# Patient Record
Sex: FEMALE | Race: OTHER | NOT HISPANIC OR LATINO | URBAN - METROPOLITAN AREA
[De-identification: names, ages, dates, MRNs, and addresses within clinical notes are randomized per-mention and may not be internally consistent; named-entity substitution may affect disease eponyms.]

---

## 2020-01-04 ENCOUNTER — INPATIENT (INPATIENT)
Facility: HOSPITAL | Age: 20
LOS: 6 days | Discharge: HOME | DRG: 326 | End: 2020-01-11
Attending: GENERAL ACUTE CARE HOSPITAL | Admitting: GENERAL ACUTE CARE HOSPITAL
Payer: COMMERCIAL

## 2020-01-04 VITALS
DIASTOLIC BLOOD PRESSURE: 62 MMHG | HEART RATE: 107 BPM | SYSTOLIC BLOOD PRESSURE: 107 MMHG | RESPIRATION RATE: 16 BRPM | OXYGEN SATURATION: 100 % | TEMPERATURE: 99 F

## 2020-01-04 DIAGNOSIS — Z98.890 OTHER SPECIFIED POSTPROCEDURAL STATES: Chronic | ICD-10-CM

## 2020-01-04 LAB
ALBUMIN SERPL ELPH-MCNC: 3.1 G/DL — LOW (ref 3.3–5)
ALP SERPL-CCNC: 50 U/L — SIGNIFICANT CHANGE UP (ref 40–120)
ALT FLD-CCNC: 8 U/L — LOW (ref 10–45)
AMYLASE P1 CFR SERPL: 29 U/L — SIGNIFICANT CHANGE UP (ref 25–125)
ANION GAP SERPL CALC-SCNC: 11 MMOL/L — SIGNIFICANT CHANGE UP (ref 5–17)
APTT BLD: 33.5 SEC — SIGNIFICANT CHANGE UP (ref 27.5–36.3)
AST SERPL-CCNC: 9 U/L — LOW (ref 10–40)
BILIRUB SERPL-MCNC: 0.9 MG/DL — SIGNIFICANT CHANGE UP (ref 0.2–1.2)
BUN SERPL-MCNC: 3 MG/DL — LOW (ref 7–23)
CALCIUM SERPL-MCNC: 8.3 MG/DL — LOW (ref 8.4–10.5)
CHLORIDE SERPL-SCNC: 106 MMOL/L — SIGNIFICANT CHANGE UP (ref 96–108)
CO2 SERPL-SCNC: 20 MMOL/L — LOW (ref 22–31)
CREAT SERPL-MCNC: 0.7 MG/DL — SIGNIFICANT CHANGE UP (ref 0.5–1.3)
GLUCOSE BLDC GLUCOMTR-MCNC: 90 MG/DL — SIGNIFICANT CHANGE UP (ref 70–99)
GLUCOSE SERPL-MCNC: 97 MG/DL — SIGNIFICANT CHANGE UP (ref 70–99)
HCG UR QL: NEGATIVE — SIGNIFICANT CHANGE UP
HCT VFR BLD CALC: 30.5 % — LOW (ref 34.5–45)
HGB BLD-MCNC: 9.9 G/DL — LOW (ref 11.5–15.5)
INR BLD: 1.42 — HIGH (ref 0.88–1.16)
LACTATE SERPL-SCNC: 0.7 MMOL/L — SIGNIFICANT CHANGE UP (ref 0.5–2)
LACTATE SERPL-SCNC: 1.3 MMOL/L — SIGNIFICANT CHANGE UP (ref 0.5–2)
LIDOCAIN IGE QN: 15 U/L — SIGNIFICANT CHANGE UP (ref 7–60)
MAGNESIUM SERPL-MCNC: 1.7 MG/DL — SIGNIFICANT CHANGE UP (ref 1.6–2.6)
MCHC RBC-ENTMCNC: 30.7 PG — SIGNIFICANT CHANGE UP (ref 27–34)
MCHC RBC-ENTMCNC: 32.5 GM/DL — SIGNIFICANT CHANGE UP (ref 32–36)
MCV RBC AUTO: 94.4 FL — SIGNIFICANT CHANGE UP (ref 80–100)
NRBC # BLD: 0 /100 WBCS — SIGNIFICANT CHANGE UP (ref 0–0)
PHOSPHATE SERPL-MCNC: 2.4 MG/DL — LOW (ref 2.5–4.5)
PLATELET # BLD AUTO: 180 K/UL — SIGNIFICANT CHANGE UP (ref 150–400)
POTASSIUM SERPL-MCNC: 4 MMOL/L — SIGNIFICANT CHANGE UP (ref 3.5–5.3)
POTASSIUM SERPL-SCNC: 4 MMOL/L — SIGNIFICANT CHANGE UP (ref 3.5–5.3)
PROT SERPL-MCNC: 6 G/DL — SIGNIFICANT CHANGE UP (ref 6–8.3)
PROTHROM AB SERPL-ACNC: 16.4 SEC — HIGH (ref 10–12.9)
RBC # BLD: 3.23 M/UL — LOW (ref 3.8–5.2)
RBC # FLD: 11.8 % — SIGNIFICANT CHANGE UP (ref 10.3–14.5)
SODIUM SERPL-SCNC: 137 MMOL/L — SIGNIFICANT CHANGE UP (ref 135–145)
WBC # BLD: 10.81 K/UL — HIGH (ref 3.8–10.5)
WBC # FLD AUTO: 10.81 K/UL — HIGH (ref 3.8–10.5)

## 2020-01-04 PROCEDURE — 44187 LAP ILEO/JEJUNO-STOMY: CPT

## 2020-01-04 PROCEDURE — 43840 GSTRRPHY SUTR DUOL/GSTR ULCR: CPT

## 2020-01-04 PROCEDURE — 71045 X-RAY EXAM CHEST 1 VIEW: CPT | Mod: 26

## 2020-01-04 PROCEDURE — 43239 EGD BIOPSY SINGLE/MULTIPLE: CPT

## 2020-01-04 PROCEDURE — 99222 1ST HOSP IP/OBS MODERATE 55: CPT | Mod: GC

## 2020-01-04 PROCEDURE — 48547 DUODENAL EXCLUSION: CPT

## 2020-01-04 PROCEDURE — 49905 OMENTAL FLAP INTRA-ABDOM: CPT

## 2020-01-04 RX ORDER — SODIUM CHLORIDE 9 MG/ML
1000 INJECTION, SOLUTION INTRAVENOUS
Refills: 0 | Status: DISCONTINUED | OUTPATIENT
Start: 2020-01-04 | End: 2020-01-08

## 2020-01-04 RX ORDER — DEXTROSE 50 % IN WATER 50 %
25 SYRINGE (ML) INTRAVENOUS ONCE
Refills: 0 | Status: DISCONTINUED | OUTPATIENT
Start: 2020-01-04 | End: 2020-01-06

## 2020-01-04 RX ORDER — FLUCONAZOLE 150 MG/1
400 TABLET ORAL EVERY 24 HOURS
Refills: 0 | Status: DISCONTINUED | OUTPATIENT
Start: 2020-01-04 | End: 2020-01-11

## 2020-01-04 RX ORDER — PANTOPRAZOLE SODIUM 20 MG/1
40 TABLET, DELAYED RELEASE ORAL
Refills: 0 | Status: DISCONTINUED | OUTPATIENT
Start: 2020-01-04 | End: 2020-01-11

## 2020-01-04 RX ORDER — DEXTROSE 50 % IN WATER 50 %
15 SYRINGE (ML) INTRAVENOUS ONCE
Refills: 0 | Status: DISCONTINUED | OUTPATIENT
Start: 2020-01-04 | End: 2020-01-06

## 2020-01-04 RX ORDER — HYDROMORPHONE HYDROCHLORIDE 2 MG/ML
1 INJECTION INTRAMUSCULAR; INTRAVENOUS; SUBCUTANEOUS EVERY 4 HOURS
Refills: 0 | Status: DISCONTINUED | OUTPATIENT
Start: 2020-01-04 | End: 2020-01-08

## 2020-01-04 RX ORDER — PIPERACILLIN AND TAZOBACTAM 4; .5 G/20ML; G/20ML
3.38 INJECTION, POWDER, LYOPHILIZED, FOR SOLUTION INTRAVENOUS ONCE
Refills: 0 | Status: COMPLETED | OUTPATIENT
Start: 2020-01-04 | End: 2020-01-04

## 2020-01-04 RX ORDER — HYDROMORPHONE HYDROCHLORIDE 2 MG/ML
0.5 INJECTION INTRAMUSCULAR; INTRAVENOUS; SUBCUTANEOUS EVERY 4 HOURS
Refills: 0 | Status: DISCONTINUED | OUTPATIENT
Start: 2020-01-04 | End: 2020-01-04

## 2020-01-04 RX ORDER — SODIUM CHLORIDE 9 MG/ML
1000 INJECTION, SOLUTION INTRAVENOUS
Refills: 0 | Status: DISCONTINUED | OUTPATIENT
Start: 2020-01-04 | End: 2020-01-06

## 2020-01-04 RX ORDER — CHLORHEXIDINE GLUCONATE 213 G/1000ML
1 SOLUTION TOPICAL
Refills: 0 | Status: DISCONTINUED | OUTPATIENT
Start: 2020-01-04 | End: 2020-01-07

## 2020-01-04 RX ORDER — GLUCAGON INJECTION, SOLUTION 0.5 MG/.1ML
1 INJECTION, SOLUTION SUBCUTANEOUS ONCE
Refills: 0 | Status: DISCONTINUED | OUTPATIENT
Start: 2020-01-04 | End: 2020-01-08

## 2020-01-04 RX ORDER — DEXTROSE 50 % IN WATER 50 %
12.5 SYRINGE (ML) INTRAVENOUS ONCE
Refills: 0 | Status: DISCONTINUED | OUTPATIENT
Start: 2020-01-04 | End: 2020-01-06

## 2020-01-04 RX ORDER — HYDROMORPHONE HYDROCHLORIDE 2 MG/ML
0.5 INJECTION INTRAMUSCULAR; INTRAVENOUS; SUBCUTANEOUS EVERY 4 HOURS
Refills: 0 | Status: DISCONTINUED | OUTPATIENT
Start: 2020-01-04 | End: 2020-01-08

## 2020-01-04 RX ORDER — PIPERACILLIN AND TAZOBACTAM 4; .5 G/20ML; G/20ML
3.38 INJECTION, POWDER, LYOPHILIZED, FOR SOLUTION INTRAVENOUS EVERY 6 HOURS
Refills: 0 | Status: DISCONTINUED | OUTPATIENT
Start: 2020-01-04 | End: 2020-01-11

## 2020-01-04 RX ORDER — INSULIN LISPRO 100/ML
VIAL (ML) SUBCUTANEOUS
Refills: 0 | Status: DISCONTINUED | OUTPATIENT
Start: 2020-01-04 | End: 2020-01-06

## 2020-01-04 RX ADMIN — SODIUM CHLORIDE 100 MILLILITER(S): 9 INJECTION, SOLUTION INTRAVENOUS at 15:15

## 2020-01-04 RX ADMIN — CHLORHEXIDINE GLUCONATE 1 APPLICATION(S): 213 SOLUTION TOPICAL at 17:00

## 2020-01-04 RX ADMIN — FLUCONAZOLE 100 MILLIGRAM(S): 150 TABLET ORAL at 16:11

## 2020-01-04 RX ADMIN — HYDROMORPHONE HYDROCHLORIDE 0.5 MILLIGRAM(S): 2 INJECTION INTRAMUSCULAR; INTRAVENOUS; SUBCUTANEOUS at 23:38

## 2020-01-04 RX ADMIN — HYDROMORPHONE HYDROCHLORIDE 0.5 MILLIGRAM(S): 2 INJECTION INTRAMUSCULAR; INTRAVENOUS; SUBCUTANEOUS at 23:50

## 2020-01-04 RX ADMIN — HYDROMORPHONE HYDROCHLORIDE 0.5 MILLIGRAM(S): 2 INJECTION INTRAMUSCULAR; INTRAVENOUS; SUBCUTANEOUS at 15:52

## 2020-01-04 RX ADMIN — PIPERACILLIN AND TAZOBACTAM 200 GRAM(S): 4; .5 INJECTION, POWDER, LYOPHILIZED, FOR SOLUTION INTRAVENOUS at 15:51

## 2020-01-04 RX ADMIN — HYDROMORPHONE HYDROCHLORIDE 0.5 MILLIGRAM(S): 2 INJECTION INTRAMUSCULAR; INTRAVENOUS; SUBCUTANEOUS at 16:20

## 2020-01-04 NOTE — CONSULT NOTE ADULT - SUBJECTIVE AND OBJECTIVE BOX
SICU CONSULT NOTE    HPI:  20 yo F hx reduction mammaplasty, previously healthy until mid-November when diagnosed w/ H. pylori (positive urea breath test, s/p amoxicillin, erythromycin, prilosec) and pyloric stricture, s/p gastroscopy w/ pyloric dilatation and finding of pyloric channel ulcer (1/2/20), now transferred from [facility] w/ gastric perforation. Patient currently endorses abdominal pain and has NGT w/ no output. Has not had a BM since Monday, however was non-bloody and still passing gas. Was febrile at OSH but currently afebrile.    ROS: pertinent positives and negatives above  Social hx: non-smoker  Family hx: gastric cancer (paternal grandmother), hepatic cancer (maternal grandfather) (04 Jan 2020 15:06)    PAST MEDICAL & SURGICAL HISTORY:  Gastritis, Helicobacter pylori  H/O reduction mammoplasty      PAST SURGICAL HISTORY:     REVIEW OF SYSTEMS:   Pertinent positives/negatives noted in HPI.     HOME MEDICATIONS:    ALLERGIES:  Allergies    pseudoephedrine (Other)    Intolerances        SOCIAL HISTORY:    FAMILY HISTORY:      Vital Signs Last 24 Hrs  T(C): 37.2 (04 Jan 2020 14:30), Max: 37.2 (04 Jan 2020 14:30)  T(F): 99 (04 Jan 2020 14:30), Max: 99 (04 Jan 2020 14:30)  HR: 121 (04 Jan 2020 15:45) (107 - 121)  BP: 115/88 (04 Jan 2020 15:45) (107/62 - 115/88)  BP(mean): 107 (04 Jan 2020 15:45) (80 - 107)  RR: 16 (04 Jan 2020 15:45) (16 - 16)  SpO2: 99% (04 Jan 2020 15:45) (99% - 100%)    PHYSICAL EXAM:  General: NAD  Pulm: normal resp effort and excursion  Abd: soft, non-distended, mild to moderate diffuse TTP greatest at L mid-abdomen, no guarding or rebound appreciated, NGT no output currently  Ext: WWP  Neuro: AAO x3, grossly normal cranial nerves  Psych: appropriate affect    LABS:                        9.9    10.81 )-----------( 180      ( 04 Jan 2020 15:02 )             30.5     01-04    137  |  106  |  3<L>  ----------------------------<  97  4.0   |  20<L>  |  0.70    Ca    8.3<L>      04 Jan 2020 15:02  Phos  2.4     01-04  Mg     1.7     01-04    TPro  6.0  /  Alb  3.1<L>  /  TBili  0.9  /  DBili  x   /  AST  9<L>  /  ALT  8<L>  /  AlkPhos  50  01-04    PT/INR - ( 04 Jan 2020 15:02 )   PT: 16.4 sec;   INR: 1.42          PTT - ( 04 Jan 2020 15:02 )  PTT:33.5 sec SICU CONSULT NOTE    HPI:  20 yo F hx reduction mammaplasty, previously healthy until mid-November when diagnosed w/ H. pylori (positive urea breath test, s/p amoxicillin, erythromycin, prilosec) and pyloric stricture, s/p gastroscopy w/ pyloric dilatation and finding of pyloric channel ulcer (1/2/20), now transferred from [facility] w/ gastric perforation. Patient currently endorses abdominal pain and has NGT w/ no output. Has not had a BM since Monday, however was non-bloody and still passing gas. Was febrile at OSH but currently afebrile.    ROS: pertinent positives and negatives above  Social hx: non-smoker  Family hx: gastric cancer (paternal grandmother), hepatic cancer (maternal grandfather) (04 Jan 2020 15:06)    PAST MEDICAL & SURGICAL HISTORY:  Gastritis, Helicobacter pylori  H/O reduction mammoplasty      PAST SURGICAL HISTORY:     REVIEW OF SYSTEMS:   Pertinent positives/negatives noted in HPI.     HOME MEDICATIONS:    ALLERGIES:  Allergies    pseudoephedrine (Other)    Intolerances        SOCIAL HISTORY:    FAMILY HISTORY:      Vital Signs Last 24 Hrs  T(C): 37.2 (04 Jan 2020 14:30), Max: 37.2 (04 Jan 2020 14:30)  T(F): 99 (04 Jan 2020 14:30), Max: 99 (04 Jan 2020 14:30)  HR: 121 (04 Jan 2020 15:45) (107 - 121)  BP: 115/88 (04 Jan 2020 15:45) (107/62 - 115/88)  BP(mean): 107 (04 Jan 2020 15:45) (80 - 107)  RR: 16 (04 Jan 2020 15:45) (16 - 16)  SpO2: 99% (04 Jan 2020 15:45) (99% - 100%)    PHYSICAL EXAM:  General: NAD  HEENT: NGT in place, PERRL, EOMI, MMM  Pulm: normal resp effort and excursion  Abd: soft, non-distended, mild to moderate diffuse TTP greatest at L mid-abdomen, no guarding or rebound appreciated, NGT no output currently  Ext: WWP  Vasc: 2+ peripheral pulses  Neuro: AAO x3, grossly normal cranial nerves  Skin: no rash  Psych: appropriate affect    LABS:                        9.9    10.81 )-----------( 180      ( 04 Jan 2020 15:02 )             30.5     01-04    137  |  106  |  3<L>  ----------------------------<  97  4.0   |  20<L>  |  0.70    Ca    8.3<L>      04 Jan 2020 15:02  Phos  2.4     01-04  Mg     1.7     01-04    TPro  6.0  /  Alb  3.1<L>  /  TBili  0.9  /  DBili  x   /  AST  9<L>  /  ALT  8<L>  /  AlkPhos  50  01-04    PT/INR - ( 04 Jan 2020 15:02 )   PT: 16.4 sec;   INR: 1.42          PTT - ( 04 Jan 2020 15:02 )  PTT:33.5 sec    CXR clear with NGT in place and free air under right hemidiaphragm

## 2020-01-04 NOTE — H&P ADULT - NSHPPHYSICALEXAM_GEN_ALL_CORE
Vital Signs Last 24 Hrs  T(C): 37.2 (04 Jan 2020 14:30), Max: 37.2 (04 Jan 2020 14:30)  T(F): 99 (04 Jan 2020 14:30), Max: 99 (04 Jan 2020 14:30)  HR: 107 (04 Jan 2020 14:30) (107 - 107)  BP: 107/62 (04 Jan 2020 14:30) (107/62 - 107/62)  BP(mean): 80 (04 Jan 2020 14:30) (80 - 80)  RR: 16 (04 Jan 2020 14:30) (16 - 16)  SpO2: --    General: NAD  Pulm: normal resp effort and excursion  Abd: soft, non-distended, mild to moderate diffuse TTP greatest at L mid-abdomen, no guarding or rebound appreciated Vital Signs Last 24 Hrs  T(C): 37.2 (04 Jan 2020 14:30), Max: 37.2 (04 Jan 2020 14:30)  T(F): 99 (04 Jan 2020 14:30), Max: 99 (04 Jan 2020 14:30)  HR: 107 (04 Jan 2020 14:30) (107 - 107)  BP: 107/62 (04 Jan 2020 14:30) (107/62 - 107/62)  BP(mean): 80 (04 Jan 2020 14:30) (80 - 80)  RR: 16 (04 Jan 2020 14:30) (16 - 16)  SpO2: --    General: NAD  Pulm: normal resp effort and excursion  Abd: soft, non-distended, mild to moderate diffuse TTP greatest at L mid-abdomen, no guarding or rebound appreciated, NGT no output currently

## 2020-01-04 NOTE — H&P ADULT - HISTORY OF PRESENT ILLNESS
20 yo F hx reduction mammaplasty, previously healthy until mid-November when diagnosed w/ H. pylori (positive urea breath test, s/p amoxicillin, erythromycin, prilosec) and pyloric stricture, s/p gastroscopy w/ pyloric dilatation and finding of pyloric channel ulcer (1/2/19), now transferred from [facility] w/ gastric perforation. Patient currently endorses abdominal pain and has NGT w/ no output. Has not had a BM since Monday, however was non-bloody and still passing gas. Was febrile at OSH but currently afebrile.    ROS: pertinent positives and negatives above  Social hx: non-smoker  Family hx: gastric cancer (paternal grandmother), hepatic cancer (maternal grandfather) 18 yo F hx reduction mammaplasty, previously healthy until mid-November when diagnosed w/ H. pylori (positive urea breath test, s/p amoxicillin, erythromycin, prilosec) and pyloric stricture, s/p gastroscopy w/ pyloric dilatation and finding of pyloric channel ulcer (1/2/20), now transferred from [facility] w/ gastric perforation. Patient currently endorses abdominal pain and has NGT w/ no output. Has not had a BM since Monday, however was non-bloody and still passing gas. Was febrile at OSH but currently afebrile.    ROS: pertinent positives and negatives above  Social hx: non-smoker  Family hx: gastric cancer (paternal grandmother), hepatic cancer (maternal grandfather)

## 2020-01-04 NOTE — BRIEF OPERATIVE NOTE - OPERATION/FINDINGS
Patient transferred 48hrs after EGD complicated by ulcer perforation, likely at pylorus. Diagnostic laparoscopy performed, though dissection limited by significant adhesions and Patient transferred 48hrs after EGD complicated by ulcer perforation, likely at pylorus. EGD performed, largely unremarkable except area of inflammation, erythema, and obstruction at pylorus, through which scope was unable to be passed. Diagnostic laparoscopy performed, though dissection limited by significant adhesions, purulence, fibrinous exudate throughout RUQ. Area of most concern and presumed perforated area intimately involved with mercedes hepatis. Decision made to convert to laparotomy. Midline incision made to allow exposure to upper abdomen. Duodenum fully kocherized--no injury to posterior aspect noted. Unable to delineate distal aspect of friable tissue, so pyloric exclusion performed with TA stapler. Gastrojejunostomy performed with linear stapler, common enterotomy hand sewn, antecolic, retrogastric. Feeding jejunostomy placed distally and secured to anterior abdominal wall. Omental josette patch performed over pyloric area of perforation. Abdomen irrigated thoroughly. JPs placed over area of perforation and behind gastrojejunostomy. NGT placed and positioning confirmed. Abdomen then closed, skin loosely approximated.

## 2020-01-04 NOTE — BRIEF OPERATIVE NOTE - NSICDXBRIEFPROCEDURE_GEN_ALL_CORE_FT
PROCEDURES:  Omentopexy 04-Jan-2020 23:00:02  Lenin, Crystal  Open jejunostomy 04-Jan-2020 22:59:35  Lenin, Crystal  Gastrojejunostomy with duodenal exclusion 04-Jan-2020 22:59:20  Lenin, Crystal  EGD 04-Jan-2020 22:59:03  Lenin, Isa  Diagnostic laparoscopy with exploratory laparotomy 04-Jan-2020 22:58:28  Lenin, Isa  Diagnostic laparoscopy 04-Jan-2020 22:58:20  Lenin, Isa

## 2020-01-04 NOTE — CONSULT NOTE ADULT - ASSESSMENT
20 yo F hx H. pylori s/p triple therapy and pyloric stenosis w/ pyloric channel ulcer s/p EGD w/ pyloric dilatation on 1/2/20 presents as transfer from OSH w/ gastric perforation.    Neuro: pain/nausea control  CV: normotensive goals  Pulm: hygiene  FENGI: LR @100, NPO, protonix BID, NGT  : voids  ID: zosyn (1/2 - ), fluconazole (1/4 - ) ///  Endo: ISS  PPx: SCDs, holding SQH as ambulatory 20 yo F hx H. pylori s/p triple therapy and pyloric stenosis w/ pyloric channel ulcer s/p EGD w/ pyloric dilatation on 1/2/20 presents as transfer from OSH w/ gastric perforation.    Neuro: pain/nausea control  CV: normotensive goals; perfusion currently adequate on 100 ml/hour  Pulm: hygiene  FENGI: LR @100, NPO, protonix BID, NGT  : voids  ID: zosyn (1/2 - ), added fluconazole (1/4 - ) empirically ///  Endo: ISS  PPx: SCDs, holding SQH as ambulatory    Plans for OR in progress.

## 2020-01-04 NOTE — H&P ADULT - ASSESSMENT
20 yo F hx H. pylori s/p triple therapy and pyloric stenosis w/ pyloric channel ulcer s/p EGD w/ pyloric dilatation on 1/2/20 presents as transfer from OSH w/ gastric perforation.    Neuro: pain/nausea control  CV: normotensive goals  Pulm: hygiene  FENGI: LR @100, NPO, protonix BID, NGT  : voids  ID: zosyn (1/2 - ), fluconazole (1/4 - ) ///  PPx: SCDs, holding SQH as ambulatory 20 yo F hx H. pylori s/p triple therapy and pyloric stenosis w/ pyloric channel ulcer s/p EGD w/ pyloric dilatation on 1/2/20 presents as transfer from OSH w/ gastric perforation.    Neuro: pain/nausea control  CV: normotensive goals  Pulm: hygiene  FENGI: LR @100, NPO, protonix BID, NGT  : voids  ID: zosyn (1/2 - ), fluconazole (1/4 - ) ///  Endo: ISS  PPx: SCDs, holding SQH as ambulatory

## 2020-01-05 LAB
ANION GAP SERPL CALC-SCNC: 10 MMOL/L — SIGNIFICANT CHANGE UP (ref 5–17)
ANION GAP SERPL CALC-SCNC: 9 MMOL/L — SIGNIFICANT CHANGE UP (ref 5–17)
BUN SERPL-MCNC: 4 MG/DL — LOW (ref 7–23)
BUN SERPL-MCNC: 4 MG/DL — LOW (ref 7–23)
CALCIUM SERPL-MCNC: 7.9 MG/DL — LOW (ref 8.4–10.5)
CALCIUM SERPL-MCNC: 8.5 MG/DL — SIGNIFICANT CHANGE UP (ref 8.4–10.5)
CHLORIDE SERPL-SCNC: 104 MMOL/L — SIGNIFICANT CHANGE UP (ref 96–108)
CHLORIDE SERPL-SCNC: 104 MMOL/L — SIGNIFICANT CHANGE UP (ref 96–108)
CO2 SERPL-SCNC: 20 MMOL/L — LOW (ref 22–31)
CO2 SERPL-SCNC: 22 MMOL/L — SIGNIFICANT CHANGE UP (ref 22–31)
CREAT SERPL-MCNC: 0.58 MG/DL — SIGNIFICANT CHANGE UP (ref 0.5–1.3)
CREAT SERPL-MCNC: 0.59 MG/DL — SIGNIFICANT CHANGE UP (ref 0.5–1.3)
GAS PNL BLDA: SIGNIFICANT CHANGE UP
GLUCOSE BLDC GLUCOMTR-MCNC: 110 MG/DL — HIGH (ref 70–99)
GLUCOSE BLDC GLUCOMTR-MCNC: 114 MG/DL — HIGH (ref 70–99)
GLUCOSE BLDC GLUCOMTR-MCNC: 120 MG/DL — HIGH (ref 70–99)
GLUCOSE BLDC GLUCOMTR-MCNC: 140 MG/DL — HIGH (ref 70–99)
GLUCOSE SERPL-MCNC: 147 MG/DL — HIGH (ref 70–99)
GLUCOSE SERPL-MCNC: 149 MG/DL — HIGH (ref 70–99)
HBA1C BLD-MCNC: 5.3 % — SIGNIFICANT CHANGE UP (ref 4–5.6)
HCT VFR BLD CALC: 29.1 % — LOW (ref 34.5–45)
HCT VFR BLD CALC: 30.1 % — LOW (ref 34.5–45)
HGB BLD-MCNC: 10.1 G/DL — LOW (ref 11.5–15.5)
HGB BLD-MCNC: 9.7 G/DL — LOW (ref 11.5–15.5)
MAGNESIUM SERPL-MCNC: 1.4 MG/DL — LOW (ref 1.6–2.6)
MAGNESIUM SERPL-MCNC: 1.6 MG/DL — SIGNIFICANT CHANGE UP (ref 1.6–2.6)
MCHC RBC-ENTMCNC: 30.8 PG — SIGNIFICANT CHANGE UP (ref 27–34)
MCHC RBC-ENTMCNC: 30.8 PG — SIGNIFICANT CHANGE UP (ref 27–34)
MCHC RBC-ENTMCNC: 33.3 GM/DL — SIGNIFICANT CHANGE UP (ref 32–36)
MCHC RBC-ENTMCNC: 33.6 GM/DL — SIGNIFICANT CHANGE UP (ref 32–36)
MCV RBC AUTO: 91.8 FL — SIGNIFICANT CHANGE UP (ref 80–100)
MCV RBC AUTO: 92.4 FL — SIGNIFICANT CHANGE UP (ref 80–100)
NRBC # BLD: 0 /100 WBCS — SIGNIFICANT CHANGE UP (ref 0–0)
NRBC # BLD: 0 /100 WBCS — SIGNIFICANT CHANGE UP (ref 0–0)
PHOSPHATE SERPL-MCNC: 3 MG/DL — SIGNIFICANT CHANGE UP (ref 2.5–4.5)
PHOSPHATE SERPL-MCNC: 3.8 MG/DL — SIGNIFICANT CHANGE UP (ref 2.5–4.5)
PLATELET # BLD AUTO: 166 K/UL — SIGNIFICANT CHANGE UP (ref 150–400)
PLATELET # BLD AUTO: 192 K/UL — SIGNIFICANT CHANGE UP (ref 150–400)
POTASSIUM SERPL-MCNC: 4.3 MMOL/L — SIGNIFICANT CHANGE UP (ref 3.5–5.3)
POTASSIUM SERPL-MCNC: 4.4 MMOL/L — SIGNIFICANT CHANGE UP (ref 3.5–5.3)
POTASSIUM SERPL-SCNC: 4.3 MMOL/L — SIGNIFICANT CHANGE UP (ref 3.5–5.3)
POTASSIUM SERPL-SCNC: 4.4 MMOL/L — SIGNIFICANT CHANGE UP (ref 3.5–5.3)
RBC # BLD: 3.15 M/UL — LOW (ref 3.8–5.2)
RBC # BLD: 3.28 M/UL — LOW (ref 3.8–5.2)
RBC # FLD: 11.6 % — SIGNIFICANT CHANGE UP (ref 10.3–14.5)
RBC # FLD: 11.7 % — SIGNIFICANT CHANGE UP (ref 10.3–14.5)
SODIUM SERPL-SCNC: 134 MMOL/L — LOW (ref 135–145)
SODIUM SERPL-SCNC: 135 MMOL/L — SIGNIFICANT CHANGE UP (ref 135–145)
WBC # BLD: 10.9 K/UL — HIGH (ref 3.8–10.5)
WBC # BLD: 13.41 K/UL — HIGH (ref 3.8–10.5)
WBC # FLD AUTO: 10.9 K/UL — HIGH (ref 3.8–10.5)
WBC # FLD AUTO: 13.41 K/UL — HIGH (ref 3.8–10.5)

## 2020-01-05 PROCEDURE — 71045 X-RAY EXAM CHEST 1 VIEW: CPT | Mod: 26

## 2020-01-05 PROCEDURE — 99233 SBSQ HOSP IP/OBS HIGH 50: CPT | Mod: GC

## 2020-01-05 RX ORDER — ACETAMINOPHEN 500 MG
1000 TABLET ORAL ONCE
Refills: 0 | Status: COMPLETED | OUTPATIENT
Start: 2020-01-05 | End: 2020-01-05

## 2020-01-05 RX ORDER — HEPARIN SODIUM 5000 [USP'U]/ML
5000 INJECTION INTRAVENOUS; SUBCUTANEOUS EVERY 8 HOURS
Refills: 0 | Status: DISCONTINUED | OUTPATIENT
Start: 2020-01-05 | End: 2020-01-11

## 2020-01-05 RX ORDER — MAGNESIUM SULFATE 500 MG/ML
2 VIAL (ML) INJECTION ONCE
Refills: 0 | Status: COMPLETED | OUTPATIENT
Start: 2020-01-05 | End: 2020-01-05

## 2020-01-05 RX ORDER — FENTANYL CITRATE 50 UG/ML
25 INJECTION INTRAVENOUS ONCE
Refills: 0 | Status: DISCONTINUED | OUTPATIENT
Start: 2020-01-05 | End: 2020-01-05

## 2020-01-05 RX ORDER — ACETAMINOPHEN 500 MG
1000 TABLET ORAL ONCE
Refills: 0 | Status: COMPLETED | OUTPATIENT
Start: 2020-01-06 | End: 2020-01-06

## 2020-01-05 RX ADMIN — Medication 400 MILLIGRAM(S): at 11:55

## 2020-01-05 RX ADMIN — HYDROMORPHONE HYDROCHLORIDE 1 MILLIGRAM(S): 2 INJECTION INTRAMUSCULAR; INTRAVENOUS; SUBCUTANEOUS at 06:29

## 2020-01-05 RX ADMIN — FENTANYL CITRATE 25 MICROGRAM(S): 50 INJECTION INTRAVENOUS at 05:19

## 2020-01-05 RX ADMIN — Medication 1000 MILLIGRAM(S): at 12:30

## 2020-01-05 RX ADMIN — PIPERACILLIN AND TAZOBACTAM 200 GRAM(S): 4; .5 INJECTION, POWDER, LYOPHILIZED, FOR SOLUTION INTRAVENOUS at 18:00

## 2020-01-05 RX ADMIN — PANTOPRAZOLE SODIUM 40 MILLIGRAM(S): 20 TABLET, DELAYED RELEASE ORAL at 17:20

## 2020-01-05 RX ADMIN — HYDROMORPHONE HYDROCHLORIDE 1 MILLIGRAM(S): 2 INJECTION INTRAMUSCULAR; INTRAVENOUS; SUBCUTANEOUS at 11:10

## 2020-01-05 RX ADMIN — HYDROMORPHONE HYDROCHLORIDE 0.5 MILLIGRAM(S): 2 INJECTION INTRAMUSCULAR; INTRAVENOUS; SUBCUTANEOUS at 13:32

## 2020-01-05 RX ADMIN — HYDROMORPHONE HYDROCHLORIDE 0.5 MILLIGRAM(S): 2 INJECTION INTRAMUSCULAR; INTRAVENOUS; SUBCUTANEOUS at 08:35

## 2020-01-05 RX ADMIN — HYDROMORPHONE HYDROCHLORIDE 1 MILLIGRAM(S): 2 INJECTION INTRAMUSCULAR; INTRAVENOUS; SUBCUTANEOUS at 10:53

## 2020-01-05 RX ADMIN — HEPARIN SODIUM 5000 UNIT(S): 5000 INJECTION INTRAVENOUS; SUBCUTANEOUS at 13:33

## 2020-01-05 RX ADMIN — HYDROMORPHONE HYDROCHLORIDE 0.5 MILLIGRAM(S): 2 INJECTION INTRAMUSCULAR; INTRAVENOUS; SUBCUTANEOUS at 13:50

## 2020-01-05 RX ADMIN — Medication 400 MILLIGRAM(S): at 23:30

## 2020-01-05 RX ADMIN — HYDROMORPHONE HYDROCHLORIDE 1 MILLIGRAM(S): 2 INJECTION INTRAMUSCULAR; INTRAVENOUS; SUBCUTANEOUS at 15:52

## 2020-01-05 RX ADMIN — PIPERACILLIN AND TAZOBACTAM 200 GRAM(S): 4; .5 INJECTION, POWDER, LYOPHILIZED, FOR SOLUTION INTRAVENOUS at 23:59

## 2020-01-05 RX ADMIN — PIPERACILLIN AND TAZOBACTAM 200 GRAM(S): 4; .5 INJECTION, POWDER, LYOPHILIZED, FOR SOLUTION INTRAVENOUS at 00:21

## 2020-01-05 RX ADMIN — HEPARIN SODIUM 5000 UNIT(S): 5000 INJECTION INTRAVENOUS; SUBCUTANEOUS at 22:00

## 2020-01-05 RX ADMIN — PANTOPRAZOLE SODIUM 40 MILLIGRAM(S): 20 TABLET, DELAYED RELEASE ORAL at 05:20

## 2020-01-05 RX ADMIN — FLUCONAZOLE 100 MILLIGRAM(S): 150 TABLET ORAL at 15:20

## 2020-01-05 RX ADMIN — HYDROMORPHONE HYDROCHLORIDE 1 MILLIGRAM(S): 2 INJECTION INTRAMUSCULAR; INTRAVENOUS; SUBCUTANEOUS at 20:12

## 2020-01-05 RX ADMIN — Medication 400 MILLIGRAM(S): at 17:20

## 2020-01-05 RX ADMIN — Medication 1000 MILLIGRAM(S): at 23:48

## 2020-01-05 RX ADMIN — PIPERACILLIN AND TAZOBACTAM 200 GRAM(S): 4; .5 INJECTION, POWDER, LYOPHILIZED, FOR SOLUTION INTRAVENOUS at 12:16

## 2020-01-05 RX ADMIN — PIPERACILLIN AND TAZOBACTAM 200 GRAM(S): 4; .5 INJECTION, POWDER, LYOPHILIZED, FOR SOLUTION INTRAVENOUS at 05:31

## 2020-01-05 RX ADMIN — Medication 50 GRAM(S): at 07:46

## 2020-01-05 RX ADMIN — Medication 1000 MILLIGRAM(S): at 18:28

## 2020-01-05 RX ADMIN — HYDROMORPHONE HYDROCHLORIDE 0.5 MILLIGRAM(S): 2 INJECTION INTRAMUSCULAR; INTRAVENOUS; SUBCUTANEOUS at 19:50

## 2020-01-05 RX ADMIN — FENTANYL CITRATE 25 MICROGRAM(S): 50 INJECTION INTRAVENOUS at 05:45

## 2020-01-05 RX ADMIN — HYDROMORPHONE HYDROCHLORIDE 1 MILLIGRAM(S): 2 INJECTION INTRAMUSCULAR; INTRAVENOUS; SUBCUTANEOUS at 16:10

## 2020-01-05 RX ADMIN — Medication 400 MILLIGRAM(S): at 01:00

## 2020-01-05 RX ADMIN — HYDROMORPHONE HYDROCHLORIDE 1 MILLIGRAM(S): 2 INJECTION INTRAMUSCULAR; INTRAVENOUS; SUBCUTANEOUS at 20:30

## 2020-01-05 RX ADMIN — HYDROMORPHONE HYDROCHLORIDE 0.5 MILLIGRAM(S): 2 INJECTION INTRAMUSCULAR; INTRAVENOUS; SUBCUTANEOUS at 19:36

## 2020-01-05 RX ADMIN — HYDROMORPHONE HYDROCHLORIDE 1 MILLIGRAM(S): 2 INJECTION INTRAMUSCULAR; INTRAVENOUS; SUBCUTANEOUS at 06:50

## 2020-01-05 RX ADMIN — Medication 1000 MILLIGRAM(S): at 01:20

## 2020-01-05 RX ADMIN — HYDROMORPHONE HYDROCHLORIDE 0.5 MILLIGRAM(S): 2 INJECTION INTRAMUSCULAR; INTRAVENOUS; SUBCUTANEOUS at 08:17

## 2020-01-05 NOTE — PROGRESS NOTE ADULT - SUBJECTIVE AND OBJECTIVE BOX
STATUS POST:  Omentopexy  Open jejunostomy  Gastrojejunostomy with duodenal exclusion  EGD  Diagnostic laparoscopy with exploratory laparotomy  Diagnostic laparoscopy    Patient seen and examined at bedside. In no acute distress. Denies N/V. C/o of abdominal pain. AROBF.     OBJECTIVE:    Vital Signs Last 24 Hrs  T(C): 36.7 (05 Jan 2020 04:37), Max: 38 (04 Jan 2020 17:01)  T(F): 98.1 (05 Jan 2020 04:37), Max: 100.4 (04 Jan 2020 17:01)  HR: 60 (05 Jan 2020 07:00) (60 - 126)  BP: 110/72 (05 Jan 2020 07:00) (98/77 - 133/65)  BP(mean): 83 (05 Jan 2020 07:00) (80 - 107)  RR: 8 (05 Jan 2020 07:00) (8 - 20)  SpO2: 98% (05 Jan 2020 07:00) (98% - 100%)    I&O's Summary    04 Jan 2020 07:01  -  05 Jan 2020 07:00  --------------------------------------------------------  IN: 1300 mL / OUT: 2155 mL / NET: -855 mL    05 Jan 2020 07:01  -  05 Jan 2020 08:24  --------------------------------------------------------  IN: 100 mL / OUT: 0 mL / NET: 100 mL        Physical Exam:  General Appearance: Appears well, NAD  HEENT: Grossly symmetric  Chest: Non labored breathing  CV: Pulse regular presently  Abdomen: Soft, epigastric tenderness, mildly distended, dressings clean and dry and intact, WILLIE x2 w/ output. J Tube for feeds.   Extremities: Grossly symmetric, no swelling, warm.     LABS:                        10.1   13.41 )-----------( 192      ( 05 Jan 2020 05:31 )             30.1     01-05    135  |  104  |  4<L>  ----------------------------<  147<H>  4.3   |  22  |  0.59    Ca    8.5      05 Jan 2020 05:31  Phos  3.8     01-05  Mg     1.6     01-05    TPro  6.0  /  Alb  3.1<L>  /  TBili  0.9  /  DBili  x   /  AST  9<L>  /  ALT  8<L>  /  AlkPhos  50  01-04    PT/INR - ( 04 Jan 2020 15:02 )   PT: 16.4 sec;   INR: 1.42          PTT - ( 04 Jan 2020 15:02 )  PTT:33.5 sec      RADIOLOGY & ADDITIONAL STUDIES:

## 2020-01-05 NOTE — PROGRESS NOTE ADULT - ASSESSMENT
18 yo F hx H. pylori s/p triple therapy and pyloric stenosis w/ pyloric channel ulcer s/p EGD w/ pyloric dilatation on 1/2/20 presents as transfer from OSH w/ gastric perforation.    Neuro: pain/nausea control  CV: normotensive goals; perfusion currently adequate on 100 ml/hour  Pulm: hygiene  FENGI: LR @100, NPO, protonix BID, NGT  : voids  ID: zosyn (1/2 - ), added fluconazole (1/4 - ) empirically ///  Endo: ISS  PPx: SCDs, holding SQH as ambulatory

## 2020-01-05 NOTE — PROGRESS NOTE ADULT - SUBJECTIVE AND OBJECTIVE BOX
INTERVAL HPI/OVERNIGHT EVENTS: ON: diagnostic lap converted to ex lap, D2 pyloric exclusion, gastrojejunostomy (antecolic, retrogastric), omental patch, feeding jejunostomy, , crystalloid 2900,   1/4/: admitted w/ gastric perf      Pain controlled with current regimen. Pt denies CP/SOB/N/V      MEDICATIONS  (STANDING):  chlorhexidine 4% Liquid 1 Application(s) Topical <User Schedule>  dextrose 5%. 1000 milliLiter(s) (50 mL/Hr) IV Continuous <Continuous>  dextrose 50% Injectable 12.5 Gram(s) IV Push once  dextrose 50% Injectable 25 Gram(s) IV Push once  dextrose 50% Injectable 25 Gram(s) IV Push once  fluconAZOLE IVPB 400 milliGRAM(s) IV Intermittent every 24 hours  heparin  Injectable 5000 Unit(s) SubCutaneous every 8 hours  insulin lispro (HumaLOG) corrective regimen sliding scale   SubCutaneous Before meals and at bedtime  lactated ringers. 1000 milliLiter(s) (100 mL/Hr) IV Continuous <Continuous>  pantoprazole  Injectable 40 milliGRAM(s) IV Push two times a day  piperacillin/tazobactam IVPB.. 3.375 Gram(s) IV Intermittent every 6 hours    MEDICATIONS  (PRN):  dextrose 40% Gel 15 Gram(s) Oral once PRN Blood Glucose LESS THAN 70 milliGRAM(s)/deciliter  glucagon  Injectable 1 milliGRAM(s) IntraMuscular once PRN Glucose LESS THAN 70 milligrams/deciliter  HYDROmorphone  Injectable 0.5 milliGRAM(s) IV Push every 4 hours PRN Moderate Pain (4 - 6)  HYDROmorphone  Injectable 1 milliGRAM(s) IV Push every 4 hours PRN Severe Pain (7 - 10)      Drug Dosing Weight  Height (cm): 160 (04 Jan 2020 15:55)  Weight (kg): 62 (04 Jan 2020 15:55)  BMI (kg/m2): 24.2 (04 Jan 2020 15:55)  BSA (m2): 1.64 (04 Jan 2020 15:55)      PAST MEDICAL & SURGICAL HISTORY:  Gastritis, Helicobacter pylori  H/O reduction mammoplasty      ICU Vital Signs Last 24 Hrs  T(C): 36.7 (05 Jan 2020 08:57), Max: 38 (04 Jan 2020 17:01)  T(F): 98 (05 Jan 2020 08:57), Max: 100.4 (04 Jan 2020 17:01)  HR: 68 (05 Jan 2020 08:00) (60 - 126)  BP: 109/57 (05 Jan 2020 08:00) (98/77 - 133/65)  BP(mean): 75 (05 Jan 2020 08:00) (75 - 107)  ABP: 100/48 (05 Jan 2020 08:00) (92/44 - 134/70)  ABP(mean): 70 (05 Jan 2020 08:00) (64 - 96)  RR: 12 (05 Jan 2020 08:00) (8 - 20)  SpO2: 95% (05 Jan 2020 08:00) (95% - 100%)      ABG - ( 05 Jan 2020 00:06 )  pH, Arterial: 7.36  pH, Blood: x     /  pCO2: 34    /  pO2: 210   / HCO3: 19    / Base Excess: -6.0  /  SaO2: 99                    04 Jan 2020 07:01  -  05 Jan 2020 07:00  --------------------------------------------------------  IN:    IV PiggyBack: 500 mL    lactated ringers.: 800 mL  Total IN: 1300 mL    OUT:    Bulb: 20 mL    Bulb: 5 mL    Drain: 150 mL    Indwelling Catheter - Urethral: 845 mL    Intermittent Catheterization - Urethral: 35 mL    Voided: 1100 mL  Total OUT: 2155 mL    Total NET: -855 mL      05 Jan 2020 07:01  -  05 Jan 2020 09:53  --------------------------------------------------------  IN:    IV PiggyBack: 100 mL  Total IN: 100 mL    OUT:  Total OUT: 0 mL    Total NET: 100 mL              PHYSICAL EXAM:  Gen: resting comfortably in bed, NAD  Neuro: A&Ox3, no focal deficits  HEENT: MMM  CV: RRR, no murmur  Pulm: CTAx2  Abd: soft, ATTP at incision site, appropriately distended. WILLIE x 2 with minimal SS output. feeding J in place without surrounding erythema or drainage  : mccauley in place  Ext: WWP, no edema          LABS:  CBC Full  -  ( 05 Jan 2020 05:31 )  WBC Count : 13.41 K/uL  RBC Count : 3.28 M/uL  Hemoglobin : 10.1 g/dL  Hematocrit : 30.1 %  Platelet Count - Automated : 192 K/uL  Mean Cell Volume : 91.8 fl  Mean Cell Hemoglobin : 30.8 pg  Mean Cell Hemoglobin Concentration : 33.6 gm/dL  Auto Neutrophil # : x  Auto Lymphocyte # : x  Auto Monocyte # : x  Auto Eosinophil # : x  Auto Basophil # : x  Auto Neutrophil % : x  Auto Lymphocyte % : x  Auto Monocyte % : x  Auto Eosinophil % : x  Auto Basophil % : x    01-05    135  |  104  |  4<L>  ----------------------------<  147<H>  4.3   |  22  |  0.59    Ca    8.5      05 Jan 2020 05:31  Phos  3.8     01-05  Mg     1.6     01-05    TPro  6.0  /  Alb  3.1<L>  /  TBili  0.9  /  DBili  x   /  AST  9<L>  /  ALT  8<L>  /  AlkPhos  50  01-04    PT/INR - ( 04 Jan 2020 15:02 )   PT: 16.4 sec;   INR: 1.42          PTT - ( 04 Jan 2020 15:02 )  PTT:33.5 sec INTERVAL HPI/OVERNIGHT EVENTS: ON: diagnostic lap converted to ex lap, D2 pyloric exclusion, gastrojejunostomy (antecolic, retrogastric), omental patch, feeding jejunostomy, , crystalloid 2900,   1/4/: admitted w/ gastric perf      Pain controlled with current regimen. Pt denies CP/SOB/N/V      MEDICATIONS  (STANDING):  chlorhexidine 4% Liquid 1 Application(s) Topical <User Schedule>  dextrose 5%. 1000 milliLiter(s) (50 mL/Hr) IV Continuous <Continuous>  dextrose 50% Injectable 12.5 Gram(s) IV Push once  dextrose 50% Injectable 25 Gram(s) IV Push once  dextrose 50% Injectable 25 Gram(s) IV Push once  fluconAZOLE IVPB 400 milliGRAM(s) IV Intermittent every 24 hours  heparin  Injectable 5000 Unit(s) SubCutaneous every 8 hours  insulin lispro (HumaLOG) corrective regimen sliding scale   SubCutaneous Before meals and at bedtime  lactated ringers. 1000 milliLiter(s) (100 mL/Hr) IV Continuous <Continuous>  pantoprazole  Injectable 40 milliGRAM(s) IV Push two times a day  piperacillin/tazobactam IVPB.. 3.375 Gram(s) IV Intermittent every 6 hours    MEDICATIONS  (PRN):  dextrose 40% Gel 15 Gram(s) Oral once PRN Blood Glucose LESS THAN 70 milliGRAM(s)/deciliter  glucagon  Injectable 1 milliGRAM(s) IntraMuscular once PRN Glucose LESS THAN 70 milligrams/deciliter  HYDROmorphone  Injectable 0.5 milliGRAM(s) IV Push every 4 hours PRN Moderate Pain (4 - 6)  HYDROmorphone  Injectable 1 milliGRAM(s) IV Push every 4 hours PRN Severe Pain (7 - 10)      Drug Dosing Weight  Height (cm): 160 (04 Jan 2020 15:55)  Weight (kg): 62 (04 Jan 2020 15:55)  BMI (kg/m2): 24.2 (04 Jan 2020 15:55)  BSA (m2): 1.64 (04 Jan 2020 15:55)      PAST MEDICAL & SURGICAL HISTORY:  Gastritis, Helicobacter pylori  H/O reduction mammoplasty      ICU Vital Signs Last 24 Hrs  T(C): 36.7 (05 Jan 2020 08:57), Max: 38 (04 Jan 2020 17:01)  T(F): 98 (05 Jan 2020 08:57), Max: 100.4 (04 Jan 2020 17:01)  HR: 68 (05 Jan 2020 08:00) (60 - 126)  BP: 109/57 (05 Jan 2020 08:00) (98/77 - 133/65)  BP(mean): 75 (05 Jan 2020 08:00) (75 - 107)  ABP: 100/48 (05 Jan 2020 08:00) (92/44 - 134/70)  ABP(mean): 70 (05 Jan 2020 08:00) (64 - 96)  RR: 12 (05 Jan 2020 08:00) (8 - 20)  SpO2: 95% (05 Jan 2020 08:00) (95% - 100%)      ABG - ( 05 Jan 2020 00:06 )  pH, Arterial: 7.36  pH, Blood: x     /  pCO2: 34    /  pO2: 210   / HCO3: 19    / Base Excess: -6.0  /  SaO2: 99                    04 Jan 2020 07:01  -  05 Jan 2020 07:00  --------------------------------------------------------  IN:    IV PiggyBack: 500 mL    lactated ringers.: 800 mL  Total IN: 1300 mL    OUT:    Bulb: 20 mL    Bulb: 5 mL    Drain: 150 mL    Indwelling Catheter - Urethral: 845 mL    Intermittent Catheterization - Urethral: 35 mL    Voided: 1100 mL  Total OUT: 2155 mL    Total NET: -855 mL      05 Jan 2020 07:01  -  05 Jan 2020 09:53  --------------------------------------------------------  IN:    IV PiggyBack: 100 mL  Total IN: 100 mL    OUT:  Total OUT: 0 mL    Total NET: 100 mL              PHYSICAL EXAM:  Gen: resting comfortably in bed, NAD  Neuro: A&Ox3, no focal deficits  HEENT: MMM  CV: RRR, no murmur  Pulm: CTAx2  Abd: soft, ATTP at incision site, appropriately distended. WILLIE x 2 with minimal SS output. feeding J in place without surrounding erythema or drainage  : mccauley in place  Ext: WWP, no edema  Skin: no rash  Psych: affect appropriate          LABS:  CBC Full  -  ( 05 Jan 2020 05:31 )  WBC Count : 13.41 K/uL  RBC Count : 3.28 M/uL  Hemoglobin : 10.1 g/dL  Hematocrit : 30.1 %  Platelet Count - Automated : 192 K/uL  Mean Cell Volume : 91.8 fl  Mean Cell Hemoglobin : 30.8 pg  Mean Cell Hemoglobin Concentration : 33.6 gm/dL  Auto Neutrophil # : x  Auto Lymphocyte # : x  Auto Monocyte # : x  Auto Eosinophil # : x  Auto Basophil # : x  Auto Neutrophil % : x  Auto Lymphocyte % : x  Auto Monocyte % : x  Auto Eosinophil % : x  Auto Basophil % : x    01-05    135  |  104  |  4<L>  ----------------------------<  147<H>  4.3   |  22  |  0.59    Ca    8.5      05 Jan 2020 05:31  Phos  3.8     01-05  Mg     1.6     01-05    TPro  6.0  /  Alb  3.1<L>  /  TBili  0.9  /  DBili  x   /  AST  9<L>  /  ALT  8<L>  /  AlkPhos  50  01-04    PT/INR - ( 04 Jan 2020 15:02 )   PT: 16.4 sec;   INR: 1.42          PTT - ( 04 Jan 2020 15:02 )  PTT:33.5 sec

## 2020-01-05 NOTE — PROGRESS NOTE ADULT - ASSESSMENT
18 yo F hx H. pylori s/p triple therapy and pyloric stenosis w/ pyloric channel ulcer s/p EGD w/ pyloric dilatation on 1/2/20 presents as transfer from OSH w/ gastric perforation.    Neuro: pain/nausea control  CV: HD stable  Pulm: RONALDO ANN: NPO with NGT to LIWS, feeding J-tube. protonix BID, LR@100. Will start TF on Monday  : TOV  ID: zosyn (1/2 - ), fluconazole (1/4 - ) ///  Endo: ISS  PPx: SCDs, SQH  Drains: R WILLIE (over perforation), L WILLIE (behind GJ), J tube (balloon w/ 3-5 cc)

## 2020-01-06 LAB
ANION GAP SERPL CALC-SCNC: 11 MMOL/L — SIGNIFICANT CHANGE UP (ref 5–17)
BUN SERPL-MCNC: 8 MG/DL — SIGNIFICANT CHANGE UP (ref 7–23)
CALCIUM SERPL-MCNC: 8.3 MG/DL — LOW (ref 8.4–10.5)
CHLORIDE SERPL-SCNC: 103 MMOL/L — SIGNIFICANT CHANGE UP (ref 96–108)
CO2 SERPL-SCNC: 24 MMOL/L — SIGNIFICANT CHANGE UP (ref 22–31)
CREAT SERPL-MCNC: 0.53 MG/DL — SIGNIFICANT CHANGE UP (ref 0.5–1.3)
GLUCOSE BLDC GLUCOMTR-MCNC: 101 MG/DL — HIGH (ref 70–99)
GLUCOSE BLDC GLUCOMTR-MCNC: 105 MG/DL — HIGH (ref 70–99)
GLUCOSE BLDC GLUCOMTR-MCNC: 113 MG/DL — HIGH (ref 70–99)
GLUCOSE BLDC GLUCOMTR-MCNC: 95 MG/DL — SIGNIFICANT CHANGE UP (ref 70–99)
GLUCOSE SERPL-MCNC: 115 MG/DL — HIGH (ref 70–99)
HCT VFR BLD CALC: 26 % — LOW (ref 34.5–45)
HGB BLD-MCNC: 8.6 G/DL — LOW (ref 11.5–15.5)
MAGNESIUM SERPL-MCNC: 2 MG/DL — SIGNIFICANT CHANGE UP (ref 1.6–2.6)
MCHC RBC-ENTMCNC: 30.8 PG — SIGNIFICANT CHANGE UP (ref 27–34)
MCHC RBC-ENTMCNC: 33.1 GM/DL — SIGNIFICANT CHANGE UP (ref 32–36)
MCV RBC AUTO: 93.2 FL — SIGNIFICANT CHANGE UP (ref 80–100)
NRBC # BLD: 0 /100 WBCS — SIGNIFICANT CHANGE UP (ref 0–0)
PHOSPHATE SERPL-MCNC: 2.2 MG/DL — LOW (ref 2.5–4.5)
PLATELET # BLD AUTO: 223 K/UL — SIGNIFICANT CHANGE UP (ref 150–400)
POTASSIUM SERPL-MCNC: 4.5 MMOL/L — SIGNIFICANT CHANGE UP (ref 3.5–5.3)
POTASSIUM SERPL-SCNC: 4.5 MMOL/L — SIGNIFICANT CHANGE UP (ref 3.5–5.3)
RBC # BLD: 2.79 M/UL — LOW (ref 3.8–5.2)
RBC # FLD: 11.9 % — SIGNIFICANT CHANGE UP (ref 10.3–14.5)
SODIUM SERPL-SCNC: 138 MMOL/L — SIGNIFICANT CHANGE UP (ref 135–145)
WBC # BLD: 9.24 K/UL — SIGNIFICANT CHANGE UP (ref 3.8–10.5)
WBC # FLD AUTO: 9.24 K/UL — SIGNIFICANT CHANGE UP (ref 3.8–10.5)

## 2020-01-06 PROCEDURE — 99231 SBSQ HOSP IP/OBS SF/LOW 25: CPT | Mod: GC

## 2020-01-06 PROCEDURE — 74240 X-RAY XM UPR GI TRC 1CNTRST: CPT | Mod: 26

## 2020-01-06 RX ORDER — SODIUM CHLORIDE 9 MG/ML
1000 INJECTION, SOLUTION INTRAVENOUS
Refills: 0 | Status: DISCONTINUED | OUTPATIENT
Start: 2020-01-06 | End: 2020-01-07

## 2020-01-06 RX ORDER — HYDROMORPHONE HYDROCHLORIDE 2 MG/ML
0.5 INJECTION INTRAMUSCULAR; INTRAVENOUS; SUBCUTANEOUS
Refills: 0 | Status: DISCONTINUED | OUTPATIENT
Start: 2020-01-06 | End: 2020-01-08

## 2020-01-06 RX ADMIN — HYDROMORPHONE HYDROCHLORIDE 0.5 MILLIGRAM(S): 2 INJECTION INTRAMUSCULAR; INTRAVENOUS; SUBCUTANEOUS at 13:25

## 2020-01-06 RX ADMIN — PANTOPRAZOLE SODIUM 40 MILLIGRAM(S): 20 TABLET, DELAYED RELEASE ORAL at 18:30

## 2020-01-06 RX ADMIN — HYDROMORPHONE HYDROCHLORIDE 1 MILLIGRAM(S): 2 INJECTION INTRAMUSCULAR; INTRAVENOUS; SUBCUTANEOUS at 11:20

## 2020-01-06 RX ADMIN — HYDROMORPHONE HYDROCHLORIDE 0.5 MILLIGRAM(S): 2 INJECTION INTRAMUSCULAR; INTRAVENOUS; SUBCUTANEOUS at 11:00

## 2020-01-06 RX ADMIN — HYDROMORPHONE HYDROCHLORIDE 0.5 MILLIGRAM(S): 2 INJECTION INTRAMUSCULAR; INTRAVENOUS; SUBCUTANEOUS at 21:10

## 2020-01-06 RX ADMIN — HYDROMORPHONE HYDROCHLORIDE 0.5 MILLIGRAM(S): 2 INJECTION INTRAMUSCULAR; INTRAVENOUS; SUBCUTANEOUS at 14:00

## 2020-01-06 RX ADMIN — HEPARIN SODIUM 5000 UNIT(S): 5000 INJECTION INTRAVENOUS; SUBCUTANEOUS at 21:06

## 2020-01-06 RX ADMIN — PANTOPRAZOLE SODIUM 40 MILLIGRAM(S): 20 TABLET, DELAYED RELEASE ORAL at 05:56

## 2020-01-06 RX ADMIN — HYDROMORPHONE HYDROCHLORIDE 1 MILLIGRAM(S): 2 INJECTION INTRAMUSCULAR; INTRAVENOUS; SUBCUTANEOUS at 05:03

## 2020-01-06 RX ADMIN — CHLORHEXIDINE GLUCONATE 1 APPLICATION(S): 213 SOLUTION TOPICAL at 06:21

## 2020-01-06 RX ADMIN — PIPERACILLIN AND TAZOBACTAM 200 GRAM(S): 4; .5 INJECTION, POWDER, LYOPHILIZED, FOR SOLUTION INTRAVENOUS at 18:28

## 2020-01-06 RX ADMIN — Medication 1000 MILLIGRAM(S): at 06:40

## 2020-01-06 RX ADMIN — FLUCONAZOLE 100 MILLIGRAM(S): 150 TABLET ORAL at 15:18

## 2020-01-06 RX ADMIN — PIPERACILLIN AND TAZOBACTAM 200 GRAM(S): 4; .5 INJECTION, POWDER, LYOPHILIZED, FOR SOLUTION INTRAVENOUS at 11:05

## 2020-01-06 RX ADMIN — HYDROMORPHONE HYDROCHLORIDE 0.5 MILLIGRAM(S): 2 INJECTION INTRAMUSCULAR; INTRAVENOUS; SUBCUTANEOUS at 08:39

## 2020-01-06 RX ADMIN — HEPARIN SODIUM 5000 UNIT(S): 5000 INJECTION INTRAVENOUS; SUBCUTANEOUS at 05:56

## 2020-01-06 RX ADMIN — HYDROMORPHONE HYDROCHLORIDE 0.5 MILLIGRAM(S): 2 INJECTION INTRAMUSCULAR; INTRAVENOUS; SUBCUTANEOUS at 19:22

## 2020-01-06 RX ADMIN — HYDROMORPHONE HYDROCHLORIDE 1 MILLIGRAM(S): 2 INJECTION INTRAMUSCULAR; INTRAVENOUS; SUBCUTANEOUS at 05:15

## 2020-01-06 RX ADMIN — HYDROMORPHONE HYDROCHLORIDE 0.5 MILLIGRAM(S): 2 INJECTION INTRAMUSCULAR; INTRAVENOUS; SUBCUTANEOUS at 18:26

## 2020-01-06 RX ADMIN — HYDROMORPHONE HYDROCHLORIDE 0.5 MILLIGRAM(S): 2 INJECTION INTRAMUSCULAR; INTRAVENOUS; SUBCUTANEOUS at 15:17

## 2020-01-06 RX ADMIN — HEPARIN SODIUM 5000 UNIT(S): 5000 INJECTION INTRAVENOUS; SUBCUTANEOUS at 13:26

## 2020-01-06 RX ADMIN — HYDROMORPHONE HYDROCHLORIDE 0.5 MILLIGRAM(S): 2 INJECTION INTRAMUSCULAR; INTRAVENOUS; SUBCUTANEOUS at 21:07

## 2020-01-06 RX ADMIN — PIPERACILLIN AND TAZOBACTAM 200 GRAM(S): 4; .5 INJECTION, POWDER, LYOPHILIZED, FOR SOLUTION INTRAVENOUS at 05:56

## 2020-01-06 RX ADMIN — SODIUM CHLORIDE 80 MILLILITER(S): 9 INJECTION, SOLUTION INTRAVENOUS at 18:27

## 2020-01-06 RX ADMIN — HYDROMORPHONE HYDROCHLORIDE 1 MILLIGRAM(S): 2 INJECTION INTRAMUSCULAR; INTRAVENOUS; SUBCUTANEOUS at 11:05

## 2020-01-06 RX ADMIN — PIPERACILLIN AND TAZOBACTAM 200 GRAM(S): 4; .5 INJECTION, POWDER, LYOPHILIZED, FOR SOLUTION INTRAVENOUS at 23:40

## 2020-01-06 RX ADMIN — Medication 400 MILLIGRAM(S): at 06:20

## 2020-01-06 RX ADMIN — HYDROMORPHONE HYDROCHLORIDE 0.5 MILLIGRAM(S): 2 INJECTION INTRAMUSCULAR; INTRAVENOUS; SUBCUTANEOUS at 16:00

## 2020-01-06 NOTE — PROGRESS NOTE ADULT - ASSESSMENT
19F PMHx H. pylori, pyloric stenosis s/p EGD w/ pyloric dilation (1/2) a/w gastric perforation, s/p D2 pyloric exclusion, gastrojejunostomy, omental patch, feeding jejunostomy (1/4).    Neuro: pain/nausea control  CV: normotensive goals  Pulm: RONALDO  FENGI: NPO, NGT, protonix BID, LR@100  : voids  ID: zosyn (1/2 - ), fluconazole (1/4 - )  Endo: ISS  PPx: SCDs, SQH  Drains: R WILLIE (over perforation), L WILLIE (behind GJ), J tube (balloon w/ 3-5 cc) 19F PMHx H. pylori, pyloric stenosis s/p EGD w/ pyloric dilation (1/2) a/w gastric perforation, s/p pyloric exclusion, gastrojejunostomy, omental patch, feeding jejunostomy (1/4). UGI series today negative for leak. SDU.    Neuro: pain/nausea control  CV: normotensive goals  Pulm: RA  FENGI: UGI series normal, advance to CLD, remove NGT, protonix BID, stop IVF given euvolemic, start Osmolyte 15cc/hr through J tube  : voids  ID: zosyn (1/2 - ), fluconazole (1/4 - )  Endo: ISS  PPx: SCDs, SQH  Drains: R WILLIE (over perforation), L WILLIE (behind GJ), J tube (balloon w/ 3-5 cc)

## 2020-01-06 NOTE — PROVIDER CONTACT NOTE (CHANGE IN STATUS NOTIFICATION) - ACTION/TREATMENT ORDERED:
JARRELL Park ordered to stop tube feedings until a doctor reassess the patient. Will restart tube feedings at a slower rate. Will relay message to night nurse.

## 2020-01-06 NOTE — PROGRESS NOTE ADULT - SUBJECTIVE AND OBJECTIVE BOX
INTERVAL HPI/OVERNIGHT EVENTS:  O/N: passed TOV  1/5: mccauley removed. SQH started    SUBJECTIVE:     PRESSORS: [ ] YES [x] NO  WHICH:  DOSE:    ANTIBIOTICS:  Zosyn                DATE STARTED: 1/2  ANTIBIOTICS: fluconazole         DATE STARTED: 1/4    CENTRAL LINE: [ ] YES [x] NO  LOCATION:   DATE INSERTED:  REMOVE: [ ] YES [ ] NO  EXPLAIN:    MCCAULEY: [ ] YES [x] NO    DATE INSERTED:  REMOVE: [ ] YES [ ] NO  EXPLAIN:    A-LINE: [ ] YES [x] NO  LOCATION:   DATE INSERTED:  REMOVE: [ ] YES [ ] NO  EXPLAIN:    ICU Vital Signs Last 24 Hrs  T(C): 36.7 (06 Jan 2020 04:44), Max: 37.4 (05 Jan 2020 14:23)  T(F): 98.1 (06 Jan 2020 04:44), Max: 99.3 (05 Jan 2020 14:23)  HR: 80 (06 Jan 2020 05:00) (58 - 80)  BP: 97/52 (05 Jan 2020 21:00) (97/52 - 109/57)  BP(mean): 73 (05 Jan 2020 21:00) (72 - 75)  ABP: 118/62 (06 Jan 2020 05:00) (92/48 - 122/68)  ABP(mean): 86 (06 Jan 2020 05:00) (61 - 90)  RR: 15 (06 Jan 2020 05:00) (4 - 16)  SpO2: 97% (06 Jan 2020 05:00) (93% - 100%)      ABG - ( 05 Jan 2020 00:06 )  pH, Arterial: 7.36  pH, Blood: x     /  pCO2: 34    /  pO2: 210   / HCO3: 19    / Base Excess: -6.0  /  SaO2: 99        05 Jan 2020 07:01  -  06 Jan 2020 07:00  --------------------------------------------------------  IN:    IV PiggyBack: 900 mL    lactated ringers.: 2300 mL  Total IN: 3200 mL    OUT:    Bulb: 15 mL    Bulb: 30 mL    Drain: 650 mL    Indwelling Catheter - Urethral: 445 mL    Voided: 600 mL  Total OUT: 1740 mL    Total NET: 1460 mL    PHYSICAL EXAM:  GEN: NAD, resting comfortably in bed  HEENT: MMM  CV: RRR  Resp: nonlabored breathing, no respiratory distress  Abd: soft, nontender, nondistended  Ext: WWP, no edema, no calf tenderness  Neuro: no focal deficits  Psych: pleasant    LABS:                        8.6    9.24  )-----------( 223      ( 06 Jan 2020 05:50 )             26.0     01-06    138  |  103  |  8   ----------------------------<  115<H>  4.5   |  24  |  0.53    Ca    8.3<L>      06 Jan 2020 05:50  Phos  2.2     01-06  Mg     2.0     01-06    TPro  6.0  /  Alb  3.1<L>  /  TBili  0.9  /  DBili  x   /  AST  9<L>  /  ALT  8<L>  /  AlkPhos  50  01-04 INTERVAL HPI/OVERNIGHT EVENTS:  O/N: passed TOV  1/5: mccauley removed. SQH started    SUBJECTIVE: This morning, she feels well; her pain is well-controlled. No nausea or vomiting. No acute complaints.    PRESSORS: [ ] YES [x] NO  WHICH:  DOSE:    ANTIBIOTICS:  Zosyn                DATE STARTED: 1/2  ANTIBIOTICS: fluconazole         DATE STARTED: 1/4    CENTRAL LINE: [ ] YES [x] NO  LOCATION:   DATE INSERTED:  REMOVE: [ ] YES [ ] NO  EXPLAIN:    MCCAULEY: [ ] YES [x] NO    DATE INSERTED:  REMOVE: [ ] YES [ ] NO  EXPLAIN:    A-LINE: [ ] YES [x] NO  LOCATION:   DATE INSERTED:  REMOVE: [ ] YES [ ] NO  EXPLAIN:    ICU Vital Signs Last 24 Hrs  T(C): 36.7 (06 Jan 2020 04:44), Max: 37.4 (05 Jan 2020 14:23)  T(F): 98.1 (06 Jan 2020 04:44), Max: 99.3 (05 Jan 2020 14:23)  HR: 80 (06 Jan 2020 05:00) (58 - 80)  BP: 97/52 (05 Jan 2020 21:00) (97/52 - 109/57)  BP(mean): 73 (05 Jan 2020 21:00) (72 - 75)  ABP: 118/62 (06 Jan 2020 05:00) (92/48 - 122/68)  ABP(mean): 86 (06 Jan 2020 05:00) (61 - 90)  RR: 15 (06 Jan 2020 05:00) (4 - 16)  SpO2: 97% (06 Jan 2020 05:00) (93% - 100%)      ABG - ( 05 Jan 2020 00:06 )  pH, Arterial: 7.36  pH, Blood: x     /  pCO2: 34    /  pO2: 210   / HCO3: 19    / Base Excess: -6.0  /  SaO2: 99        05 Jan 2020 07:01  -  06 Jan 2020 07:00  --------------------------------------------------------  IN:    IV PiggyBack: 900 mL    lactated ringers.: 2300 mL  Total IN: 3200 mL    OUT:    Bulb: 15 mL    Bulb: 30 mL    Drain: 650 mL    Indwelling Catheter - Urethral: 445 mL    Voided: 600 mL  Total OUT: 1740 mL    Total NET: 1460 mL    PHYSICAL EXAM:  GEN: NAD, resting comfortably in bed  HEENT: MMM  CV: RRR  Resp: nonlabored breathing, no respiratory distress, CTA b/l  Abd: soft, nontender, nondistended, midline incision CDI, bilateral JPs serosanguinous, J tube in place  Ext: WWP, no edema, no calf tenderness  Neuro: no focal deficits  Psych: pleasant    LABS:                        8.6    9.24  )-----------( 223      ( 06 Jan 2020 05:50 )             26.0     01-06    138  |  103  |  8   ----------------------------<  115<H>  4.5   |  24  |  0.53    Ca    8.3<L>      06 Jan 2020 05:50  Phos  2.2     01-06  Mg     2.0     01-06    TPro  6.0  /  Alb  3.1<L>  /  TBili  0.9  /  DBili  x   /  AST  9<L>  /  ALT  8<L>  /  AlkPhos  50  01-04

## 2020-01-07 LAB
ANION GAP SERPL CALC-SCNC: 11 MMOL/L — SIGNIFICANT CHANGE UP (ref 5–17)
BUN SERPL-MCNC: 6 MG/DL — LOW (ref 7–23)
CALCIUM SERPL-MCNC: 8.1 MG/DL — LOW (ref 8.4–10.5)
CHLORIDE SERPL-SCNC: 102 MMOL/L — SIGNIFICANT CHANGE UP (ref 96–108)
CO2 SERPL-SCNC: 25 MMOL/L — SIGNIFICANT CHANGE UP (ref 22–31)
CREAT SERPL-MCNC: 0.6 MG/DL — SIGNIFICANT CHANGE UP (ref 0.5–1.3)
GLUCOSE SERPL-MCNC: 95 MG/DL — SIGNIFICANT CHANGE UP (ref 70–99)
HCT VFR BLD CALC: 25.8 % — LOW (ref 34.5–45)
HGB BLD-MCNC: 8.6 G/DL — LOW (ref 11.5–15.5)
MAGNESIUM SERPL-MCNC: 1.9 MG/DL — SIGNIFICANT CHANGE UP (ref 1.6–2.6)
MCHC RBC-ENTMCNC: 31.3 PG — SIGNIFICANT CHANGE UP (ref 27–34)
MCHC RBC-ENTMCNC: 33.3 GM/DL — SIGNIFICANT CHANGE UP (ref 32–36)
MCV RBC AUTO: 93.8 FL — SIGNIFICANT CHANGE UP (ref 80–100)
NRBC # BLD: 0 /100 WBCS — SIGNIFICANT CHANGE UP (ref 0–0)
PHOSPHATE SERPL-MCNC: 2.1 MG/DL — LOW (ref 2.5–4.5)
PLATELET # BLD AUTO: 233 K/UL — SIGNIFICANT CHANGE UP (ref 150–400)
POTASSIUM SERPL-MCNC: 3.6 MMOL/L — SIGNIFICANT CHANGE UP (ref 3.5–5.3)
POTASSIUM SERPL-SCNC: 3.6 MMOL/L — SIGNIFICANT CHANGE UP (ref 3.5–5.3)
RBC # BLD: 2.75 M/UL — LOW (ref 3.8–5.2)
RBC # FLD: 11.9 % — SIGNIFICANT CHANGE UP (ref 10.3–14.5)
SODIUM SERPL-SCNC: 138 MMOL/L — SIGNIFICANT CHANGE UP (ref 135–145)
WBC # BLD: 6.16 K/UL — SIGNIFICANT CHANGE UP (ref 3.8–10.5)
WBC # FLD AUTO: 6.16 K/UL — SIGNIFICANT CHANGE UP (ref 3.8–10.5)

## 2020-01-07 RX ORDER — POTASSIUM PHOSPHATE, MONOBASIC POTASSIUM PHOSPHATE, DIBASIC 236; 224 MG/ML; MG/ML
15 INJECTION, SOLUTION INTRAVENOUS ONCE
Refills: 0 | Status: DISCONTINUED | OUTPATIENT
Start: 2020-01-07 | End: 2020-01-07

## 2020-01-07 RX ORDER — HYDROMORPHONE HYDROCHLORIDE 2 MG/ML
1 INJECTION INTRAMUSCULAR; INTRAVENOUS; SUBCUTANEOUS ONCE
Refills: 0 | Status: DISCONTINUED | OUTPATIENT
Start: 2020-01-07 | End: 2020-01-07

## 2020-01-07 RX ORDER — SODIUM,POTASSIUM PHOSPHATES 278-250MG
1 POWDER IN PACKET (EA) ORAL ONCE
Refills: 0 | Status: COMPLETED | OUTPATIENT
Start: 2020-01-07 | End: 2020-01-07

## 2020-01-07 RX ADMIN — PANTOPRAZOLE SODIUM 40 MILLIGRAM(S): 20 TABLET, DELAYED RELEASE ORAL at 06:27

## 2020-01-07 RX ADMIN — HYDROMORPHONE HYDROCHLORIDE 1 MILLIGRAM(S): 2 INJECTION INTRAMUSCULAR; INTRAVENOUS; SUBCUTANEOUS at 05:00

## 2020-01-07 RX ADMIN — HYDROMORPHONE HYDROCHLORIDE 0.5 MILLIGRAM(S): 2 INJECTION INTRAMUSCULAR; INTRAVENOUS; SUBCUTANEOUS at 22:30

## 2020-01-07 RX ADMIN — HYDROMORPHONE HYDROCHLORIDE 0.5 MILLIGRAM(S): 2 INJECTION INTRAMUSCULAR; INTRAVENOUS; SUBCUTANEOUS at 12:03

## 2020-01-07 RX ADMIN — HYDROMORPHONE HYDROCHLORIDE 0.5 MILLIGRAM(S): 2 INJECTION INTRAMUSCULAR; INTRAVENOUS; SUBCUTANEOUS at 16:50

## 2020-01-07 RX ADMIN — HYDROMORPHONE HYDROCHLORIDE 0.5 MILLIGRAM(S): 2 INJECTION INTRAMUSCULAR; INTRAVENOUS; SUBCUTANEOUS at 19:27

## 2020-01-07 RX ADMIN — PIPERACILLIN AND TAZOBACTAM 200 GRAM(S): 4; .5 INJECTION, POWDER, LYOPHILIZED, FOR SOLUTION INTRAVENOUS at 06:27

## 2020-01-07 RX ADMIN — HYDROMORPHONE HYDROCHLORIDE 0.5 MILLIGRAM(S): 2 INJECTION INTRAMUSCULAR; INTRAVENOUS; SUBCUTANEOUS at 16:11

## 2020-01-07 RX ADMIN — HYDROMORPHONE HYDROCHLORIDE 0.5 MILLIGRAM(S): 2 INJECTION INTRAMUSCULAR; INTRAVENOUS; SUBCUTANEOUS at 21:48

## 2020-01-07 RX ADMIN — Medication 1 PACKET(S): at 12:02

## 2020-01-07 RX ADMIN — HYDROMORPHONE HYDROCHLORIDE 0.5 MILLIGRAM(S): 2 INJECTION INTRAMUSCULAR; INTRAVENOUS; SUBCUTANEOUS at 15:00

## 2020-01-07 RX ADMIN — HEPARIN SODIUM 5000 UNIT(S): 5000 INJECTION INTRAVENOUS; SUBCUTANEOUS at 06:27

## 2020-01-07 RX ADMIN — PANTOPRAZOLE SODIUM 40 MILLIGRAM(S): 20 TABLET, DELAYED RELEASE ORAL at 18:11

## 2020-01-07 RX ADMIN — HYDROMORPHONE HYDROCHLORIDE 1 MILLIGRAM(S): 2 INJECTION INTRAMUSCULAR; INTRAVENOUS; SUBCUTANEOUS at 07:12

## 2020-01-07 RX ADMIN — HYDROMORPHONE HYDROCHLORIDE 1 MILLIGRAM(S): 2 INJECTION INTRAMUSCULAR; INTRAVENOUS; SUBCUTANEOUS at 00:08

## 2020-01-07 RX ADMIN — HYDROMORPHONE HYDROCHLORIDE 0.5 MILLIGRAM(S): 2 INJECTION INTRAMUSCULAR; INTRAVENOUS; SUBCUTANEOUS at 19:04

## 2020-01-07 RX ADMIN — HEPARIN SODIUM 5000 UNIT(S): 5000 INJECTION INTRAVENOUS; SUBCUTANEOUS at 21:48

## 2020-01-07 RX ADMIN — HYDROMORPHONE HYDROCHLORIDE 0.5 MILLIGRAM(S): 2 INJECTION INTRAMUSCULAR; INTRAVENOUS; SUBCUTANEOUS at 12:30

## 2020-01-07 RX ADMIN — PIPERACILLIN AND TAZOBACTAM 200 GRAM(S): 4; .5 INJECTION, POWDER, LYOPHILIZED, FOR SOLUTION INTRAVENOUS at 18:12

## 2020-01-07 RX ADMIN — FLUCONAZOLE 100 MILLIGRAM(S): 150 TABLET ORAL at 14:30

## 2020-01-07 RX ADMIN — HYDROMORPHONE HYDROCHLORIDE 1 MILLIGRAM(S): 2 INJECTION INTRAMUSCULAR; INTRAVENOUS; SUBCUTANEOUS at 07:13

## 2020-01-07 RX ADMIN — HEPARIN SODIUM 5000 UNIT(S): 5000 INJECTION INTRAVENOUS; SUBCUTANEOUS at 14:30

## 2020-01-07 RX ADMIN — PIPERACILLIN AND TAZOBACTAM 200 GRAM(S): 4; .5 INJECTION, POWDER, LYOPHILIZED, FOR SOLUTION INTRAVENOUS at 12:02

## 2020-01-07 RX ADMIN — HYDROMORPHONE HYDROCHLORIDE 0.5 MILLIGRAM(S): 2 INJECTION INTRAMUSCULAR; INTRAVENOUS; SUBCUTANEOUS at 14:29

## 2020-01-07 RX ADMIN — HYDROMORPHONE HYDROCHLORIDE 1 MILLIGRAM(S): 2 INJECTION INTRAMUSCULAR; INTRAVENOUS; SUBCUTANEOUS at 00:15

## 2020-01-07 RX ADMIN — HYDROMORPHONE HYDROCHLORIDE 1 MILLIGRAM(S): 2 INJECTION INTRAMUSCULAR; INTRAVENOUS; SUBCUTANEOUS at 04:48

## 2020-01-07 NOTE — PROGRESS NOTE ADULT - ASSESSMENT
19F PMHx H. pylori, pyloric stenosis s/p EGD w/ pyloric dilation (1/2) a/w gastric perforation, s/p pyloric exclusion, gastrojejunostomy, omental patch, feeding jejunostomy (1/4).    Neuro: pain/nausea control  CV: normotensive goals  Pulm: RONALDO IBRAHIMI: CLD, Jtube osmolite@10cc/hr, , protonix BID  : voids  ID: zosyn (1/2 - ), fluconazole (1/4 - )  Endo: ISS  PPx: SCDs, SQH  Drains: R WILLIE (over perforation), L WILLIE (behind GJ), J tube (balloon w/ 3-5 cc)

## 2020-01-07 NOTE — PROGRESS NOTE ADULT - SUBJECTIVE AND OBJECTIVE BOX
Post op day 2 from ex lap, pyloric exclusion, gastrojejunostomy, omental patch and feeind jejuonostomy placement.  SUBJECTIVE: Patient seen and examined bedside. Patient reports moderate abdominal pain worse when trying to get out of bed. Patient denies passing flatus but feels as if it may happen soon. Patient denies bowel movements. Patient denies nausea.    fluconAZOLE IVPB 400 milliGRAM(s) IV Intermittent every 24 hours  heparin  Injectable 5000 Unit(s) SubCutaneous every 8 hours  piperacillin/tazobactam IVPB.. 3.375 Gram(s) IV Intermittent every 6 hours      Vital Signs Last 24 Hrs  T(C): 36.7 (07 Jan 2020 05:00), Max: 37.4 (06 Jan 2020 22:10)  T(F): 98 (07 Jan 2020 05:00), Max: 99.3 (06 Jan 2020 22:10)  HR: 72 (07 Jan 2020 04:38) (63 - 92)  BP: 111/73 (07 Jan 2020 04:38) (111/65 - 123/80)  BP(mean): 89 (07 Jan 2020 04:38) (82 - 97)  RR: 16 (07 Jan 2020 04:38) (8 - 18)  SpO2: 98% (07 Jan 2020 04:38) (95% - 98%)  I&O's Detail    06 Jan 2020 07:01  -  07 Jan 2020 07:00  --------------------------------------------------------  IN:    IV PiggyBack: 300 mL    lactated ringers.: 960 mL    lactated ringers.: 100 mL  Total IN: 1360 mL    OUT:    Bulb: 70 mL    Bulb: 65 mL    Voided: 1750 mL  Total OUT: 1885 mL    Total NET: -525 mL          General: NAD, resting comfortably in bed  C/V: NSR  Pulm: Nonlabored breathing, no respiratory distress  Abd: soft, nondistended. moderately tender to palpation near surgical incisions. midline incision clean, dry ,and intact. JPx 2 ss. feeding J tube.  Extrem: WWP, no edema, SCDs in place        LABS:                        8.6    6.16  )-----------( 233      ( 07 Jan 2020 06:32 )             25.8     01-07    138  |  102  |  6<L>  ----------------------------<  95  3.6   |  25  |  0.60    Ca    8.1<L>      07 Jan 2020 06:32  Phos  2.1     01-07  Mg     1.9     01-07            RADIOLOGY & ADDITIONAL STUDIES:

## 2020-01-08 LAB
ANION GAP SERPL CALC-SCNC: 11 MMOL/L — SIGNIFICANT CHANGE UP (ref 5–17)
BUN SERPL-MCNC: 4 MG/DL — LOW (ref 7–23)
CALCIUM SERPL-MCNC: 8.7 MG/DL — SIGNIFICANT CHANGE UP (ref 8.4–10.5)
CHLORIDE SERPL-SCNC: 104 MMOL/L — SIGNIFICANT CHANGE UP (ref 96–108)
CO2 SERPL-SCNC: 24 MMOL/L — SIGNIFICANT CHANGE UP (ref 22–31)
CREAT SERPL-MCNC: 0.54 MG/DL — SIGNIFICANT CHANGE UP (ref 0.5–1.3)
GLUCOSE SERPL-MCNC: 100 MG/DL — HIGH (ref 70–99)
HCT VFR BLD CALC: 27 % — LOW (ref 34.5–45)
HGB BLD-MCNC: 8.9 G/DL — LOW (ref 11.5–15.5)
MAGNESIUM SERPL-MCNC: 1.9 MG/DL — SIGNIFICANT CHANGE UP (ref 1.6–2.6)
MCHC RBC-ENTMCNC: 30.8 PG — SIGNIFICANT CHANGE UP (ref 27–34)
MCHC RBC-ENTMCNC: 33 GM/DL — SIGNIFICANT CHANGE UP (ref 32–36)
MCV RBC AUTO: 93.4 FL — SIGNIFICANT CHANGE UP (ref 80–100)
NRBC # BLD: 0 /100 WBCS — SIGNIFICANT CHANGE UP (ref 0–0)
PHOSPHATE SERPL-MCNC: 3.3 MG/DL — SIGNIFICANT CHANGE UP (ref 2.5–4.5)
PLATELET # BLD AUTO: 255 K/UL — SIGNIFICANT CHANGE UP (ref 150–400)
POTASSIUM SERPL-MCNC: 3.5 MMOL/L — SIGNIFICANT CHANGE UP (ref 3.5–5.3)
POTASSIUM SERPL-SCNC: 3.5 MMOL/L — SIGNIFICANT CHANGE UP (ref 3.5–5.3)
RBC # BLD: 2.89 M/UL — LOW (ref 3.8–5.2)
RBC # FLD: 11.7 % — SIGNIFICANT CHANGE UP (ref 10.3–14.5)
SODIUM SERPL-SCNC: 139 MMOL/L — SIGNIFICANT CHANGE UP (ref 135–145)
WBC # BLD: 5.35 K/UL — SIGNIFICANT CHANGE UP (ref 3.8–10.5)
WBC # FLD AUTO: 5.35 K/UL — SIGNIFICANT CHANGE UP (ref 3.8–10.5)

## 2020-01-08 RX ORDER — OXYCODONE AND ACETAMINOPHEN 5; 325 MG/1; MG/1
2 TABLET ORAL EVERY 4 HOURS
Refills: 0 | Status: DISCONTINUED | OUTPATIENT
Start: 2020-01-08 | End: 2020-01-09

## 2020-01-08 RX ORDER — POTASSIUM CHLORIDE 20 MEQ
40 PACKET (EA) ORAL ONCE
Refills: 0 | Status: COMPLETED | OUTPATIENT
Start: 2020-01-08 | End: 2020-01-08

## 2020-01-08 RX ORDER — HYDROMORPHONE HYDROCHLORIDE 2 MG/ML
0.5 INJECTION INTRAMUSCULAR; INTRAVENOUS; SUBCUTANEOUS EVERY 4 HOURS
Refills: 0 | Status: DISCONTINUED | OUTPATIENT
Start: 2020-01-08 | End: 2020-01-11

## 2020-01-08 RX ORDER — OXYCODONE AND ACETAMINOPHEN 5; 325 MG/1; MG/1
1 TABLET ORAL EVERY 4 HOURS
Refills: 0 | Status: DISCONTINUED | OUTPATIENT
Start: 2020-01-08 | End: 2020-01-09

## 2020-01-08 RX ADMIN — HYDROMORPHONE HYDROCHLORIDE 0.5 MILLIGRAM(S): 2 INJECTION INTRAMUSCULAR; INTRAVENOUS; SUBCUTANEOUS at 01:51

## 2020-01-08 RX ADMIN — PANTOPRAZOLE SODIUM 40 MILLIGRAM(S): 20 TABLET, DELAYED RELEASE ORAL at 17:43

## 2020-01-08 RX ADMIN — HYDROMORPHONE HYDROCHLORIDE 0.5 MILLIGRAM(S): 2 INJECTION INTRAMUSCULAR; INTRAVENOUS; SUBCUTANEOUS at 07:15

## 2020-01-08 RX ADMIN — PIPERACILLIN AND TAZOBACTAM 200 GRAM(S): 4; .5 INJECTION, POWDER, LYOPHILIZED, FOR SOLUTION INTRAVENOUS at 23:21

## 2020-01-08 RX ADMIN — HEPARIN SODIUM 5000 UNIT(S): 5000 INJECTION INTRAVENOUS; SUBCUTANEOUS at 14:58

## 2020-01-08 RX ADMIN — OXYCODONE AND ACETAMINOPHEN 1 TABLET(S): 5; 325 TABLET ORAL at 12:08

## 2020-01-08 RX ADMIN — HYDROMORPHONE HYDROCHLORIDE 0.5 MILLIGRAM(S): 2 INJECTION INTRAMUSCULAR; INTRAVENOUS; SUBCUTANEOUS at 06:42

## 2020-01-08 RX ADMIN — OXYCODONE AND ACETAMINOPHEN 1 TABLET(S): 5; 325 TABLET ORAL at 09:29

## 2020-01-08 RX ADMIN — HEPARIN SODIUM 5000 UNIT(S): 5000 INJECTION INTRAVENOUS; SUBCUTANEOUS at 06:41

## 2020-01-08 RX ADMIN — HYDROMORPHONE HYDROCHLORIDE 0.5 MILLIGRAM(S): 2 INJECTION INTRAMUSCULAR; INTRAVENOUS; SUBCUTANEOUS at 02:24

## 2020-01-08 RX ADMIN — HYDROMORPHONE HYDROCHLORIDE 0.5 MILLIGRAM(S): 2 INJECTION INTRAMUSCULAR; INTRAVENOUS; SUBCUTANEOUS at 10:08

## 2020-01-08 RX ADMIN — PIPERACILLIN AND TAZOBACTAM 200 GRAM(S): 4; .5 INJECTION, POWDER, LYOPHILIZED, FOR SOLUTION INTRAVENOUS at 12:31

## 2020-01-08 RX ADMIN — HYDROMORPHONE HYDROCHLORIDE 0.5 MILLIGRAM(S): 2 INJECTION INTRAMUSCULAR; INTRAVENOUS; SUBCUTANEOUS at 00:15

## 2020-01-08 RX ADMIN — PIPERACILLIN AND TAZOBACTAM 200 GRAM(S): 4; .5 INJECTION, POWDER, LYOPHILIZED, FOR SOLUTION INTRAVENOUS at 00:00

## 2020-01-08 RX ADMIN — HYDROMORPHONE HYDROCHLORIDE 0.5 MILLIGRAM(S): 2 INJECTION INTRAMUSCULAR; INTRAVENOUS; SUBCUTANEOUS at 00:00

## 2020-01-08 RX ADMIN — OXYCODONE AND ACETAMINOPHEN 1 TABLET(S): 5; 325 TABLET ORAL at 21:38

## 2020-01-08 RX ADMIN — PANTOPRAZOLE SODIUM 40 MILLIGRAM(S): 20 TABLET, DELAYED RELEASE ORAL at 06:41

## 2020-01-08 RX ADMIN — FLUCONAZOLE 100 MILLIGRAM(S): 150 TABLET ORAL at 14:58

## 2020-01-08 RX ADMIN — PIPERACILLIN AND TAZOBACTAM 200 GRAM(S): 4; .5 INJECTION, POWDER, LYOPHILIZED, FOR SOLUTION INTRAVENOUS at 06:41

## 2020-01-08 RX ADMIN — OXYCODONE AND ACETAMINOPHEN 1 TABLET(S): 5; 325 TABLET ORAL at 15:37

## 2020-01-08 RX ADMIN — Medication 40 MILLIEQUIVALENT(S): at 09:29

## 2020-01-08 RX ADMIN — OXYCODONE AND ACETAMINOPHEN 1 TABLET(S): 5; 325 TABLET ORAL at 17:00

## 2020-01-08 RX ADMIN — HYDROMORPHONE HYDROCHLORIDE 0.5 MILLIGRAM(S): 2 INJECTION INTRAMUSCULAR; INTRAVENOUS; SUBCUTANEOUS at 12:08

## 2020-01-08 RX ADMIN — HEPARIN SODIUM 5000 UNIT(S): 5000 INJECTION INTRAVENOUS; SUBCUTANEOUS at 22:02

## 2020-01-08 RX ADMIN — PIPERACILLIN AND TAZOBACTAM 200 GRAM(S): 4; .5 INJECTION, POWDER, LYOPHILIZED, FOR SOLUTION INTRAVENOUS at 17:43

## 2020-01-08 RX ADMIN — OXYCODONE AND ACETAMINOPHEN 1 TABLET(S): 5; 325 TABLET ORAL at 20:38

## 2020-01-08 NOTE — PHYSICAL THERAPY INITIAL EVALUATION ADULT - GAIT DEVIATIONS NOTED, PT EVAL
decreased willie/decreased step length/fairly steady gait, no LOB/knee buckling noted, good negotiation through hallway obstacles; increased time required with turning

## 2020-01-08 NOTE — DIETITIAN INITIAL EVALUATION ADULT. - OTHER INFO
19F PMHx H. pylori, pyloric stenosis s/p EGD w/ pyloric dilation (1/2) a/w gastric perforation, s/p pyloric exclusion, gastrojejunostomy, omental patch, feeding jejunostomy (1/4). Pt seen in room, resting in bed, w/ father at bedside. Pt was started on jtube feeds yesterday (Osmolite 1.2 @10ml/hr) however initial intake resulted in very loose watery stool. Feeds were running at 1ml/hr at time of assessment with a goal of 10ml/hr. Pt denies N/V at present, c/o gas pain, concerned for PT 2/2 poor control of bowels at present. Pt has had a significant wt loss over last 2 months 2/2 PO intolerance from pyloric stenosis. States she would eat but vomit immediately. UBW is 145lbs, states current BW is 125lbs (bed scale weight reading 132lbs). This indicates a 13-20lb unintentional wt loss (8.9%-13.7% wt change) x2mo. Pt suspected to have severe protein calorie malnutrition 2/2 >8% wt loss and meeting <50% EER x2mo. Discussed w/ pt purpose of EN for supplementation and post-op healing until pt is capable of meeting needs PO. Pt and father receptive and expressed understanding. Pt reports having celiac disease- consuming gluten causes severe abdominal pain, loose BMs and nausea. Placed gluten allergy in EMR- pt states she does not require foods to be pre-packaged and is okay we receiving GF foods prepared in a kitchen w/ gluten (runs risk of cross contamination). D/w kitchen providing GF menu for pt to see options. Discussed w/ pt monitoring sugar intake to avoid dumping 2/2 resection of pyloric valve and therefor rapid emptying of contents into intestines. Recommend monitoring fiber intake while healing. RD contact info provided for future questions/concerns. No other food allergies or dietary restrictions. Skin: surgical incision; GI: loose BMs once feeds were initiated. See EN recs below. RD to follow.

## 2020-01-08 NOTE — DIETITIAN INITIAL EVALUATION ADULT. - ADD RECOMMEND
Recommend Osmolite 1.2 @59ml/hr x24hrs via Jtube (1416ml TV, 1699kcal, 78g pro, 1161ml FW). Begin at 10ml/hr and advance by 10ml/hr Q8H as tolerated. Monitor for s/s intolerance. If pt continues to have diarrhea as EN is advanced consider switching over to Vital 1.5 @48ml/hr x24hrs via Jtube (1152ml TV, 1728kcal, 77g pro, 880ml FW). For PO diet, recommend CSTCHO, Low fiber, Soft, w/ Ensure High Protein BID (320kcal, 32g pro). MVI daily.

## 2020-01-08 NOTE — DIETITIAN INITIAL EVALUATION ADULT. - ENERGY NEEDS
Height: 5'3" Weight: 132lbs, IBW 115lbs+/-10%, %%, BMI 24.2  ABW used for calculations as pt between % of IBW.   Nutrient needs based on [facility] standards of care for maintenance in adults.   Needs adjusted for post-op healing and suspected severe PCM

## 2020-01-08 NOTE — PHYSICAL THERAPY INITIAL EVALUATION ADULT - ADDITIONAL COMMENTS
Patient reports previously independent with all ADLs/IADLs prior to admission. Actively in nursing school. Patient reports previously independent with all ADLs/IADLs prior to admission. Actively in nursing school. Reports family will be available to assist patient as needed upon discharge from [facility].

## 2020-01-08 NOTE — CHART NOTE - NSCHARTNOTEFT_GEN_A_CORE
Upon Nutritional Assessment by the Registered Dietitian your patient was determined to meet criteria / has evidence of the following diagnosis/diagnoses:          [ ]  Mild Protein Calorie Malnutrition        [ ]  Moderate Protein Calorie Malnutrition        [x ] Severe Protein Calorie Malnutrition        [ ] Unspecified Protein Calorie Malnutrition        [ ] Underweight / BMI <19        [ ] Morbid Obesity / BMI > 40    Findings as based on:  •  Comprehensive nutrition assessment and consultation  •  Calorie counts (nutrient intake analysis)  •  Food acceptance and intake status from observations by staff  •  Follow up  •  Patient education  •  Intervention secondary to interdisciplinary rounds  •   concerns    Pt has had a significant wt loss over last 2 months 2/2 PO intolerance from pyloric stenosis.   States she would eat but vomit immediately.   UBW is 145lbs, states current BW is 125lbs (bed scale weight reading 132lbs).   This indicates a 13-20lb unintentional wt loss (8.9%-13.7% wt change) x2mo.   Pt suspected to have severe protein calorie malnutrition 2/2 >8% wt loss and meeting <50% EER x2mo.     Treatment:    The following diet has been recommended:  1) For PO diet recommend CSTCHO, Low Fiber, Soft, w/ Ensure High Protein BID (320kcal, 32g pro)  Monitor sugar intake (<10g/serving) 2/2 preventing dumping/further GI distress 2/2 resection of pyloric valve and therefor more rapid emptying of gastric contents  2) For EN, recommend Osmolite 1.2 @59ml/hr x24hrs via Jtube (1416ml TV, 1699kcal, 78g pro, 1161ml FW). Begin at 10ml/hr and advance by 10ml/hr Q8H as tolerated. Monitor for s/s intolerance.   3) If pt continues to have diarrhea as EN is advanced consider switching over to Vital 1.5 @48ml/hr x24hrs via Jtube (1152ml TV, 1728kcal, 77g pro, 880ml FW).   D/w PA, order place for MD verification.     PROVIDER Section:     By signing this assessment you are acknowledging and agree with the diagnosis/diagnoses assigned by the Registered Dietitian    Comments: Upon Nutritional Assessment by the Registered Dietitian your patient was determined to meet criteria / has evidence of the following diagnosis/diagnoses:          [ ]  Mild Protein Calorie Malnutrition        [ ]  Moderate Protein Calorie Malnutrition        [x ] Severe Protein Calorie Malnutrition        [ ] Unspecified Protein Calorie Malnutrition        [ ] Underweight / BMI <19        [ ] Morbid Obesity / BMI > 40    Findings as based on:  •  Comprehensive nutrition assessment and consultation  •  Calorie counts (nutrient intake analysis)  •  Food acceptance and intake status from observations by staff  •  Follow up  •  Patient education  •  Intervention secondary to interdisciplinary rounds  •   concerns    Pt has had a significant wt loss over last 2 months 2/2 PO intolerance from pyloric stenosis.   States she would eat but vomit immediately.   UBW is 145lbs, states current BW is 125lbs (bed scale weight reading 132lbs).   This indicates a 13-20lb unintentional wt loss (8.9%-13.7% wt change) x2mo.   Pt suspected to have severe protein calorie malnutrition 2/2 >8% wt loss and meeting <50% EER x2mo.     Treatment:    The following diet has been recommended:  1) For PO diet recommend CSTCHO, Low Fiber, Soft, w/ Ensure High Protein BID (320kcal, 32g pro)  Monitor sugar intake (<10g/serving) 2/2 preventing dumping/further GI distress 2/2 resection of pyloric valve and therefor more rapid emptying of gastric contents  2) For EN recommend more peptide, hydrolyzed formula: Vital 1.5 @48ml/hr x24hrs via Jtube (1152ml TV, 1728kcal, 77g pro, 880ml FW).   D/w PA, order place for MD verification.     PROVIDER Section:     By signing this assessment you are acknowledging and agree with the diagnosis/diagnoses assigned by the Registered Dietitian    Comments: Upon Nutritional Assessment by the Registered Dietitian your patient was determined to meet criteria / has evidence of the following diagnosis/diagnoses:          [ ]  Mild Protein Calorie Malnutrition        [ ]  Moderate Protein Calorie Malnutrition        [x ] Severe Protein Calorie Malnutrition        [ ] Unspecified Protein Calorie Malnutrition        [ ] Underweight / BMI <19        [ ] Morbid Obesity / BMI > 40    Findings as based on:  •  Comprehensive nutrition assessment and consultation  •  Calorie counts (nutrient intake analysis)  •  Food acceptance and intake status from observations by staff  •  Follow up  •  Patient education  •  Intervention secondary to interdisciplinary rounds  •   concerns    Pt has had a significant wt loss over last 2 months 2/2 PO intolerance from pyloric stenosis.   States she would eat but vomit immediately.   UBW is 145lbs, states current BW is 125lbs (bed scale weight reading 132lbs).   This indicates a 13-20lb unintentional wt loss (8.9%-13.7% wt change) x2mo.   Pt suspected to have severe protein calorie malnutrition 2/2 >8% wt loss and meeting <50% EER x2mo.     Treatment:    The following diet has been recommended:  1) For PO diet recommend CSTCHO, Low Fiber, Soft, w/ Ensure High Protein BID (320kcal, 32g pro)  Monitor sugar intake (<10g/serving) 2/2 preventing dumping/further GI distress 2/2 resection of pyloric sphincter and therefor more rapid emptying of gastric contents  2) For EN recommend more peptide, hydrolyzed formula: Vital 1.5 @48ml/hr x24hrs via Jtube (1152ml TV, 1728kcal, 77g pro, 880ml FW).   D/w PA, order place for MD verification.     PROVIDER Section:     By signing this assessment you are acknowledging and agree with the diagnosis/diagnoses assigned by the Registered Dietitian    Comments:

## 2020-01-08 NOTE — PHYSICAL THERAPY INITIAL EVALUATION ADULT - DID THE PATIENT HAVE SURGERY?
Omentopexy; Open jejunostomy; Gastrojejunostomy with duodenal exclusion; Diagnostic laparoscopy with exploratory laparotomy; Diagnostic laparoscopy/yes

## 2020-01-08 NOTE — PHYSICAL THERAPY INITIAL EVALUATION ADULT - PERTINENT HX OF CURRENT PROBLEM, REHAB EVAL
18 yo F hx reduction mammaplasty, previously healthy until mid-November when diagnosed w/ H. pylori (positive urea breath test, s/p amoxicillin, erythromycin, prilosec) and pyloric stricture, s/p gastroscopy w/ pyloric dilatation and finding of pyloric channel ulcer (1/2/20), now transferred from [facility] w/ gastric perforation. Please refer to [facility] for remaining.

## 2020-01-08 NOTE — PHYSICAL THERAPY INITIAL EVALUATION ADULT - PHYSICAL ASSIST/NONPHYSICAL ASSIST: TOILET, REHAB EVAL
stand <-> sit; therapist performed total hygiene in standing for patient 2/2 abdominal discomfort with truncal rotation/1 person assist/verbal cues/nonverbal cues (demo/gestures)

## 2020-01-08 NOTE — PHYSICAL THERAPY INITIAL EVALUATION ADULT - THERAPY FREQUENCY, PT EVAL
2-3x/week/Patient educated on frequency of inpatient therapy at [facility], patient verbalized understanding.

## 2020-01-08 NOTE — PHYSICAL THERAPY INITIAL EVALUATION ADULT - PHYSICAL ASSIST/NONPHYSICAL ASSIST: SUPINE/SIT, REHAB EVAL
1 person assist/verbal cues/nonverbal cues (demo/gestures)/from (R) side-lying; able to bring B/L LEs to EOB with VCs; increased assist for trunk mobility to maintain abdominal comfort

## 2020-01-08 NOTE — DIETITIAN INITIAL EVALUATION ADULT. - MALNUTRITION
Protein calorie malnutrition suspected. Please see malnutrition chart note for indicators and recommendations. Suspected Severe PCM

## 2020-01-08 NOTE — DIETITIAN INITIAL EVALUATION ADULT. - NAME AND PHONE
Yes: RD contact info provided to pt for future questions/concerns. Nidia Orozco MPH, RD, CDN, [facility] Ext [phone]

## 2020-01-08 NOTE — DIETITIAN INITIAL EVALUATION ADULT. - ENTERAL
Osmolite 1.2 @59ml/hr x24hrs via [facility] (1416ml TV, 1699kcal, 78g pro, 1161ml FW). *see recs for Vital 1.5 (more peptide based formula) if Osmolite is poorly tolerated.

## 2020-01-08 NOTE — PROGRESS NOTE ADULT - SUBJECTIVE AND OBJECTIVE BOX
SUBJECTIVE: Feeling better, +BF, juan antonio CLD. Patient seen and examined bedside by chief resident.    fluconAZOLE IVPB 400 milliGRAM(s) IV Intermittent every 24 hours  heparin  Injectable 5000 Unit(s) SubCutaneous every 8 hours  piperacillin/tazobactam IVPB.. 3.375 Gram(s) IV Intermittent every 6 hours    MEDICATIONS  (PRN):  glucagon  Injectable 1 milliGRAM(s) IntraMuscular once PRN Glucose LESS THAN 70 milligrams/deciliter  HYDROmorphone  Injectable 0.5 milliGRAM(s) IV Push every 2 hours PRN breakthrough pain  HYDROmorphone  Injectable 0.5 milliGRAM(s) IV Push every 4 hours PRN Moderate Pain (4 - 6)  HYDROmorphone  Injectable 1 milliGRAM(s) IV Push every 4 hours PRN Severe Pain (7 - 10)      I&O's Detail    07 Jan 2020 07:01  -  08 Jan 2020 07:00  --------------------------------------------------------  IN:    Osmolite 1.2: 120 mL  Total IN: 120 mL    OUT:    Bulb: 40 mL    Bulb: 40 mL    Voided: 1450 mL  Total OUT: 1530 mL    Total NET: -1410 mL          Vital Signs Last 24 Hrs  T(C): 36.4 (08 Jan 2020 05:00), Max: 37 (07 Jan 2020 13:00)  T(F): 97.6 (08 Jan 2020 05:00), Max: 98.6 (07 Jan 2020 13:00)  HR: 78 (08 Jan 2020 04:39) (64 - 82)  BP: 122/85 (08 Jan 2020 04:39) (109/69 - 133/84)  BP(mean): 100 (08 Jan 2020 04:39) (77 - 102)  RR: 18 (08 Jan 2020 04:39) (16 - 18)  SpO2: 97% (08 Jan 2020 04:39) (94% - 99%)    General: NAD, resting comfortably in bed  C/V: NSR  Pulm: Nonlabored breathing, no respiratory distress  Abd: soft, NT/ND, WILLIE x2 SS, incisional dressing CDI, feeding J in place  Extrem: SCDs in place    LABS:                        8.6    6.16  )-----------( 233      ( 07 Jan 2020 06:32 )             25.8     01-07    138  |  102  |  6<L>  ----------------------------<  95  3.6   |  25  |  0.60    Ca    8.1<L>      07 Jan 2020 06:32  Phos  2.1     01-07  Mg     1.9     01-07            RADIOLOGY & ADDITIONAL STUDIES:

## 2020-01-09 LAB
ANION GAP SERPL CALC-SCNC: 13 MMOL/L — SIGNIFICANT CHANGE UP (ref 5–17)
BUN SERPL-MCNC: 4 MG/DL — LOW (ref 7–23)
CALCIUM SERPL-MCNC: 9.1 MG/DL — SIGNIFICANT CHANGE UP (ref 8.4–10.5)
CHLORIDE SERPL-SCNC: 103 MMOL/L — SIGNIFICANT CHANGE UP (ref 96–108)
CO2 SERPL-SCNC: 24 MMOL/L — SIGNIFICANT CHANGE UP (ref 22–31)
CREAT SERPL-MCNC: 0.64 MG/DL — SIGNIFICANT CHANGE UP (ref 0.5–1.3)
GLUCOSE BLDC GLUCOMTR-MCNC: 84 MG/DL — SIGNIFICANT CHANGE UP (ref 70–99)
GLUCOSE SERPL-MCNC: 96 MG/DL — SIGNIFICANT CHANGE UP (ref 70–99)
HCT VFR BLD CALC: 28.8 % — LOW (ref 34.5–45)
HGB BLD-MCNC: 9.6 G/DL — LOW (ref 11.5–15.5)
MAGNESIUM SERPL-MCNC: 2.1 MG/DL — SIGNIFICANT CHANGE UP (ref 1.6–2.6)
MCHC RBC-ENTMCNC: 30.7 PG — SIGNIFICANT CHANGE UP (ref 27–34)
MCHC RBC-ENTMCNC: 33.3 GM/DL — SIGNIFICANT CHANGE UP (ref 32–36)
MCV RBC AUTO: 92 FL — SIGNIFICANT CHANGE UP (ref 80–100)
NRBC # BLD: 0 /100 WBCS — SIGNIFICANT CHANGE UP (ref 0–0)
PHOSPHATE SERPL-MCNC: 4.4 MG/DL — SIGNIFICANT CHANGE UP (ref 2.5–4.5)
PLATELET # BLD AUTO: 318 K/UL — SIGNIFICANT CHANGE UP (ref 150–400)
POTASSIUM SERPL-MCNC: 3.7 MMOL/L — SIGNIFICANT CHANGE UP (ref 3.5–5.3)
POTASSIUM SERPL-SCNC: 3.7 MMOL/L — SIGNIFICANT CHANGE UP (ref 3.5–5.3)
RBC # BLD: 3.13 M/UL — LOW (ref 3.8–5.2)
RBC # FLD: 11.9 % — SIGNIFICANT CHANGE UP (ref 10.3–14.5)
SODIUM SERPL-SCNC: 140 MMOL/L — SIGNIFICANT CHANGE UP (ref 135–145)
WBC # BLD: 4.76 K/UL — SIGNIFICANT CHANGE UP (ref 3.8–10.5)
WBC # FLD AUTO: 4.76 K/UL — SIGNIFICANT CHANGE UP (ref 3.8–10.5)

## 2020-01-09 RX ORDER — SODIUM CHLORIDE 9 MG/ML
500 INJECTION, SOLUTION INTRAVENOUS ONCE
Refills: 0 | Status: COMPLETED | OUTPATIENT
Start: 2020-01-09 | End: 2020-01-09

## 2020-01-09 RX ORDER — ACETAMINOPHEN 500 MG
650 TABLET ORAL EVERY 6 HOURS
Refills: 0 | Status: DISCONTINUED | OUTPATIENT
Start: 2020-01-09 | End: 2020-01-09

## 2020-01-09 RX ORDER — HYDROMORPHONE HYDROCHLORIDE 2 MG/ML
2 INJECTION INTRAMUSCULAR; INTRAVENOUS; SUBCUTANEOUS EVERY 4 HOURS
Refills: 0 | Status: DISCONTINUED | OUTPATIENT
Start: 2020-01-09 | End: 2020-01-11

## 2020-01-09 RX ORDER — POTASSIUM CHLORIDE 20 MEQ
40 PACKET (EA) ORAL ONCE
Refills: 0 | Status: COMPLETED | OUTPATIENT
Start: 2020-01-09 | End: 2020-01-09

## 2020-01-09 RX ORDER — ONDANSETRON 8 MG/1
4 TABLET, FILM COATED ORAL EVERY 6 HOURS
Refills: 0 | Status: DISCONTINUED | OUTPATIENT
Start: 2020-01-09 | End: 2020-01-11

## 2020-01-09 RX ORDER — ACETAMINOPHEN 500 MG
650 TABLET ORAL EVERY 6 HOURS
Refills: 0 | Status: DISCONTINUED | OUTPATIENT
Start: 2020-01-09 | End: 2020-01-11

## 2020-01-09 RX ORDER — POTASSIUM CHLORIDE 20 MEQ
40 PACKET (EA) ORAL ONCE
Refills: 0 | Status: DISCONTINUED | OUTPATIENT
Start: 2020-01-09 | End: 2020-01-09

## 2020-01-09 RX ADMIN — Medication 650 MILLIGRAM(S): at 10:58

## 2020-01-09 RX ADMIN — HYDROMORPHONE HYDROCHLORIDE 2 MILLIGRAM(S): 2 INJECTION INTRAMUSCULAR; INTRAVENOUS; SUBCUTANEOUS at 21:09

## 2020-01-09 RX ADMIN — FLUCONAZOLE 100 MILLIGRAM(S): 150 TABLET ORAL at 14:49

## 2020-01-09 RX ADMIN — HYDROMORPHONE HYDROCHLORIDE 0.5 MILLIGRAM(S): 2 INJECTION INTRAMUSCULAR; INTRAVENOUS; SUBCUTANEOUS at 21:44

## 2020-01-09 RX ADMIN — OXYCODONE AND ACETAMINOPHEN 1 TABLET(S): 5; 325 TABLET ORAL at 01:05

## 2020-01-09 RX ADMIN — PIPERACILLIN AND TAZOBACTAM 200 GRAM(S): 4; .5 INJECTION, POWDER, LYOPHILIZED, FOR SOLUTION INTRAVENOUS at 17:29

## 2020-01-09 RX ADMIN — PIPERACILLIN AND TAZOBACTAM 200 GRAM(S): 4; .5 INJECTION, POWDER, LYOPHILIZED, FOR SOLUTION INTRAVENOUS at 23:14

## 2020-01-09 RX ADMIN — OXYCODONE AND ACETAMINOPHEN 1 TABLET(S): 5; 325 TABLET ORAL at 07:59

## 2020-01-09 RX ADMIN — ONDANSETRON 4 MILLIGRAM(S): 8 TABLET, FILM COATED ORAL at 20:56

## 2020-01-09 RX ADMIN — HYDROMORPHONE HYDROCHLORIDE 0.5 MILLIGRAM(S): 2 INJECTION INTRAMUSCULAR; INTRAVENOUS; SUBCUTANEOUS at 17:29

## 2020-01-09 RX ADMIN — PANTOPRAZOLE SODIUM 40 MILLIGRAM(S): 20 TABLET, DELAYED RELEASE ORAL at 05:51

## 2020-01-09 RX ADMIN — Medication 40 MILLIEQUIVALENT(S): at 10:58

## 2020-01-09 RX ADMIN — HEPARIN SODIUM 5000 UNIT(S): 5000 INJECTION INTRAVENOUS; SUBCUTANEOUS at 14:49

## 2020-01-09 RX ADMIN — Medication 650 MILLIGRAM(S): at 11:30

## 2020-01-09 RX ADMIN — OXYCODONE AND ACETAMINOPHEN 1 TABLET(S): 5; 325 TABLET ORAL at 02:05

## 2020-01-09 RX ADMIN — HEPARIN SODIUM 5000 UNIT(S): 5000 INJECTION INTRAVENOUS; SUBCUTANEOUS at 23:15

## 2020-01-09 RX ADMIN — HYDROMORPHONE HYDROCHLORIDE 2 MILLIGRAM(S): 2 INJECTION INTRAMUSCULAR; INTRAVENOUS; SUBCUTANEOUS at 14:46

## 2020-01-09 RX ADMIN — HEPARIN SODIUM 5000 UNIT(S): 5000 INJECTION INTRAVENOUS; SUBCUTANEOUS at 05:50

## 2020-01-09 RX ADMIN — Medication 650 MILLIGRAM(S): at 23:58

## 2020-01-09 RX ADMIN — PIPERACILLIN AND TAZOBACTAM 200 GRAM(S): 4; .5 INJECTION, POWDER, LYOPHILIZED, FOR SOLUTION INTRAVENOUS at 11:01

## 2020-01-09 RX ADMIN — PANTOPRAZOLE SODIUM 40 MILLIGRAM(S): 20 TABLET, DELAYED RELEASE ORAL at 17:29

## 2020-01-09 RX ADMIN — HYDROMORPHONE HYDROCHLORIDE 0.5 MILLIGRAM(S): 2 INJECTION INTRAMUSCULAR; INTRAVENOUS; SUBCUTANEOUS at 12:20

## 2020-01-09 RX ADMIN — Medication 650 MILLIGRAM(S): at 17:29

## 2020-01-09 RX ADMIN — HYDROMORPHONE HYDROCHLORIDE 0.5 MILLIGRAM(S): 2 INJECTION INTRAMUSCULAR; INTRAVENOUS; SUBCUTANEOUS at 11:47

## 2020-01-09 RX ADMIN — HYDROMORPHONE HYDROCHLORIDE 2 MILLIGRAM(S): 2 INJECTION INTRAMUSCULAR; INTRAVENOUS; SUBCUTANEOUS at 20:23

## 2020-01-09 RX ADMIN — HYDROMORPHONE HYDROCHLORIDE 2 MILLIGRAM(S): 2 INJECTION INTRAMUSCULAR; INTRAVENOUS; SUBCUTANEOUS at 15:15

## 2020-01-09 RX ADMIN — Medication 650 MILLIGRAM(S): at 23:15

## 2020-01-09 RX ADMIN — HYDROMORPHONE HYDROCHLORIDE 0.5 MILLIGRAM(S): 2 INJECTION INTRAMUSCULAR; INTRAVENOUS; SUBCUTANEOUS at 22:02

## 2020-01-09 RX ADMIN — OXYCODONE AND ACETAMINOPHEN 1 TABLET(S): 5; 325 TABLET ORAL at 06:59

## 2020-01-09 RX ADMIN — PIPERACILLIN AND TAZOBACTAM 200 GRAM(S): 4; .5 INJECTION, POWDER, LYOPHILIZED, FOR SOLUTION INTRAVENOUS at 05:51

## 2020-01-09 RX ADMIN — SODIUM CHLORIDE 500 MILLILITER(S): 9 INJECTION, SOLUTION INTRAVENOUS at 09:55

## 2020-01-09 NOTE — PROGRESS NOTE ADULT - ASSESSMENT
19F PMHx H. pylori, pyloric stenosis s/p EGD w/ pyloric dilation (1/2) a/w gastric perforation, s/p pyloric exclusion, gastrojejunostomy, omental patch, feeding jejunostomy (1/4).    Neuro: pain/nausea control  FENGI: CLD, Jtube osmolite@15cc/hr, protonix BID  ID: zosyn (1/2 - ), fluconazole (1/4 - )  Endo: ISS  PPx: SCDs, SQH  Drains: R WILLIE (over perforation), L WILLIE (behind GJ), J tube (balloon w/ 3-5 cc)

## 2020-01-09 NOTE — PROGRESS NOTE ADULT - SUBJECTIVE AND OBJECTIVE BOX
POD: 5  Procedure: diagnostic lap converted to open, D2 pyloric exclusion, gastrojejunostomy, omental patch, feeding jejunostomy     SUBJECTIVE: Patient seen and examined by chief resident on morning rounds. Pain continues to improve. Tolerating tube feeds overnight as well as a soft diet with no n/v or bloating. Ambulating, passing flatus, having liquid bowel movements.       Vital Signs Last 24 Hrs  T(C): 36.8 (09 Jan 2020 05:00), Max: 37 (08 Jan 2020 10:00)  T(F): 98.3 (09 Jan 2020 05:00), Max: 98.6 (08 Jan 2020 10:00)  HR: 68 (09 Jan 2020 05:00) (60 - 90)  BP: 104/66 (09 Jan 2020 05:00) (104/66 - 131/75)  BP(mean): 82 (08 Jan 2020 16:10) (82 - 95)  RR: 18 (09 Jan 2020 05:00) (16 - 18)  SpO2: 97% (09 Jan 2020 05:00) (96% - 100%)    Physical Exam:  General: NAD  Pulmonary: Nonlabored breathing, no respiratory distress  Abdominal: soft, minimally distended, mildly tender to palpation with no rebound or guarding, WILLIE X2 with ss output, incisions CDI, J tube in place with TFs running   Extremities: WWP, normal strength, no clubbing/cyanosis/edema  Neuro: A/O x3    Lines/drains/tubes:    I&O's Summary    08 Jan 2020 07:01  -  09 Jan 2020 07:00  --------------------------------------------------------  IN: 594 mL / OUT: 2750 mL / NET: -2156 mL        LABS:                        9.6    4.76  )-----------( 318      ( 09 Jan 2020 07:07 )             28.8     01-09    140  |  103  |  4<L>  ----------------------------<  96  3.7   |  24  |  0.64    Ca    9.1      09 Jan 2020 07:07  Phos  4.4     01-09  Mg     2.1     01-09          CAPILLARY BLOOD GLUCOSE            RADIOLOGY & ADDITIONAL STUDIES:

## 2020-01-10 ENCOUNTER — TRANSCRIPTION ENCOUNTER (OUTPATIENT)
Age: 20
End: 2020-01-10

## 2020-01-10 LAB
ANION GAP SERPL CALC-SCNC: 10 MMOL/L — SIGNIFICANT CHANGE UP (ref 5–17)
APPEARANCE UR: CLEAR — SIGNIFICANT CHANGE UP
BILIRUB UR-MCNC: NEGATIVE — SIGNIFICANT CHANGE UP
BUN SERPL-MCNC: 6 MG/DL — LOW (ref 7–23)
CALCIUM SERPL-MCNC: 9.1 MG/DL — SIGNIFICANT CHANGE UP (ref 8.4–10.5)
CHLORIDE SERPL-SCNC: 104 MMOL/L — SIGNIFICANT CHANGE UP (ref 96–108)
CO2 SERPL-SCNC: 25 MMOL/L — SIGNIFICANT CHANGE UP (ref 22–31)
COLOR SPEC: YELLOW — SIGNIFICANT CHANGE UP
CREAT SERPL-MCNC: 0.7 MG/DL — SIGNIFICANT CHANGE UP (ref 0.5–1.3)
DIFF PNL FLD: ABNORMAL
GLUCOSE SERPL-MCNC: 94 MG/DL — SIGNIFICANT CHANGE UP (ref 70–99)
GLUCOSE UR QL: NEGATIVE — SIGNIFICANT CHANGE UP
HCT VFR BLD CALC: 27.2 % — LOW (ref 34.5–45)
HGB BLD-MCNC: 8.8 G/DL — LOW (ref 11.5–15.5)
KETONES UR-MCNC: NEGATIVE — SIGNIFICANT CHANGE UP
LEUKOCYTE ESTERASE UR-ACNC: NEGATIVE — SIGNIFICANT CHANGE UP
MAGNESIUM SERPL-MCNC: 2.1 MG/DL — SIGNIFICANT CHANGE UP (ref 1.6–2.6)
MCHC RBC-ENTMCNC: 30.7 PG — SIGNIFICANT CHANGE UP (ref 27–34)
MCHC RBC-ENTMCNC: 32.4 GM/DL — SIGNIFICANT CHANGE UP (ref 32–36)
MCV RBC AUTO: 94.8 FL — SIGNIFICANT CHANGE UP (ref 80–100)
NITRITE UR-MCNC: NEGATIVE — SIGNIFICANT CHANGE UP
NRBC # BLD: 0 /100 WBCS — SIGNIFICANT CHANGE UP (ref 0–0)
PH UR: 7 — SIGNIFICANT CHANGE UP (ref 5–8)
PHOSPHATE SERPL-MCNC: 4.7 MG/DL — HIGH (ref 2.5–4.5)
PLATELET # BLD AUTO: 321 K/UL — SIGNIFICANT CHANGE UP (ref 150–400)
POTASSIUM SERPL-MCNC: 4.2 MMOL/L — SIGNIFICANT CHANGE UP (ref 3.5–5.3)
POTASSIUM SERPL-SCNC: 4.2 MMOL/L — SIGNIFICANT CHANGE UP (ref 3.5–5.3)
PROT UR-MCNC: NEGATIVE MG/DL — SIGNIFICANT CHANGE UP
RBC # BLD: 2.87 M/UL — LOW (ref 3.8–5.2)
RBC # FLD: 11.9 % — SIGNIFICANT CHANGE UP (ref 10.3–14.5)
SODIUM SERPL-SCNC: 139 MMOL/L — SIGNIFICANT CHANGE UP (ref 135–145)
SP GR SPEC: <=1.005 — SIGNIFICANT CHANGE UP (ref 1–1.03)
UROBILINOGEN FLD QL: 0.2 E.U./DL — SIGNIFICANT CHANGE UP
WBC # BLD: 4.52 K/UL — SIGNIFICANT CHANGE UP (ref 3.8–10.5)
WBC # FLD AUTO: 4.52 K/UL — SIGNIFICANT CHANGE UP (ref 3.8–10.5)

## 2020-01-10 RX ORDER — PHENAZOPYRIDINE HCL 100 MG
100 TABLET ORAL EVERY 8 HOURS
Refills: 0 | Status: DISCONTINUED | OUTPATIENT
Start: 2020-01-10 | End: 2020-01-10

## 2020-01-10 RX ADMIN — HYDROMORPHONE HYDROCHLORIDE 2 MILLIGRAM(S): 2 INJECTION INTRAMUSCULAR; INTRAVENOUS; SUBCUTANEOUS at 14:17

## 2020-01-10 RX ADMIN — Medication 100 MILLIGRAM(S): at 14:16

## 2020-01-10 RX ADMIN — Medication 650 MILLIGRAM(S): at 06:00

## 2020-01-10 RX ADMIN — HEPARIN SODIUM 5000 UNIT(S): 5000 INJECTION INTRAVENOUS; SUBCUTANEOUS at 13:50

## 2020-01-10 RX ADMIN — PIPERACILLIN AND TAZOBACTAM 200 GRAM(S): 4; .5 INJECTION, POWDER, LYOPHILIZED, FOR SOLUTION INTRAVENOUS at 05:14

## 2020-01-10 RX ADMIN — HYDROMORPHONE HYDROCHLORIDE 2 MILLIGRAM(S): 2 INJECTION INTRAMUSCULAR; INTRAVENOUS; SUBCUTANEOUS at 06:00

## 2020-01-10 RX ADMIN — HYDROMORPHONE HYDROCHLORIDE 2 MILLIGRAM(S): 2 INJECTION INTRAMUSCULAR; INTRAVENOUS; SUBCUTANEOUS at 20:16

## 2020-01-10 RX ADMIN — Medication 0.5 MILLIGRAM(S): at 08:48

## 2020-01-10 RX ADMIN — Medication 650 MILLIGRAM(S): at 11:52

## 2020-01-10 RX ADMIN — PIPERACILLIN AND TAZOBACTAM 200 GRAM(S): 4; .5 INJECTION, POWDER, LYOPHILIZED, FOR SOLUTION INTRAVENOUS at 23:02

## 2020-01-10 RX ADMIN — Medication 650 MILLIGRAM(S): at 23:45

## 2020-01-10 RX ADMIN — Medication 650 MILLIGRAM(S): at 05:14

## 2020-01-10 RX ADMIN — PIPERACILLIN AND TAZOBACTAM 200 GRAM(S): 4; .5 INJECTION, POWDER, LYOPHILIZED, FOR SOLUTION INTRAVENOUS at 18:10

## 2020-01-10 RX ADMIN — HYDROMORPHONE HYDROCHLORIDE 2 MILLIGRAM(S): 2 INJECTION INTRAMUSCULAR; INTRAVENOUS; SUBCUTANEOUS at 05:14

## 2020-01-10 RX ADMIN — HYDROMORPHONE HYDROCHLORIDE 2 MILLIGRAM(S): 2 INJECTION INTRAMUSCULAR; INTRAVENOUS; SUBCUTANEOUS at 13:49

## 2020-01-10 RX ADMIN — PANTOPRAZOLE SODIUM 40 MILLIGRAM(S): 20 TABLET, DELAYED RELEASE ORAL at 18:09

## 2020-01-10 RX ADMIN — HEPARIN SODIUM 5000 UNIT(S): 5000 INJECTION INTRAVENOUS; SUBCUTANEOUS at 23:02

## 2020-01-10 RX ADMIN — PANTOPRAZOLE SODIUM 40 MILLIGRAM(S): 20 TABLET, DELAYED RELEASE ORAL at 05:14

## 2020-01-10 RX ADMIN — Medication 650 MILLIGRAM(S): at 23:01

## 2020-01-10 RX ADMIN — Medication 650 MILLIGRAM(S): at 19:59

## 2020-01-10 RX ADMIN — HEPARIN SODIUM 5000 UNIT(S): 5000 INJECTION INTRAVENOUS; SUBCUTANEOUS at 05:14

## 2020-01-10 RX ADMIN — Medication 650 MILLIGRAM(S): at 18:11

## 2020-01-10 RX ADMIN — Medication 650 MILLIGRAM(S): at 11:44

## 2020-01-10 RX ADMIN — PIPERACILLIN AND TAZOBACTAM 200 GRAM(S): 4; .5 INJECTION, POWDER, LYOPHILIZED, FOR SOLUTION INTRAVENOUS at 11:44

## 2020-01-10 RX ADMIN — HYDROMORPHONE HYDROCHLORIDE 2 MILLIGRAM(S): 2 INJECTION INTRAMUSCULAR; INTRAVENOUS; SUBCUTANEOUS at 09:37

## 2020-01-10 RX ADMIN — HYDROMORPHONE HYDROCHLORIDE 2 MILLIGRAM(S): 2 INJECTION INTRAMUSCULAR; INTRAVENOUS; SUBCUTANEOUS at 10:12

## 2020-01-10 RX ADMIN — HYDROMORPHONE HYDROCHLORIDE 2 MILLIGRAM(S): 2 INJECTION INTRAMUSCULAR; INTRAVENOUS; SUBCUTANEOUS at 20:45

## 2020-01-10 RX ADMIN — FLUCONAZOLE 100 MILLIGRAM(S): 150 TABLET ORAL at 14:56

## 2020-01-10 NOTE — PROGRESS NOTE ADULT - ASSESSMENT
19F PMHx H. pylori, pyloric stenosis s/p EGD w/ pyloric dilation (1/2) a/w gastric perforation, s/p pyloric exclusion, gastrojejunostomy, omental patch, feeding jejunostomy (1/4).    Regular diet, Jtube TF@10cc/hr, protonix BID  Pain/nausea control  Zosyn (1/2 - ), fluconazole (1/4 - )  SCDs/SQH  OOBA/IS  Drains: R WILLIE (over perforation), L WILLIE (behind GJ), J tube (balloon w/ 3-5 cc)

## 2020-01-10 NOTE — DISCHARGE NOTE PROVIDER - NSDCMRMEDTOKEN_GEN_ALL_CORE_FT
Colace 100 mg oral capsule: 1 cap(s) orally 2 times a day, As Needed -for rash - for constipation   Dilaudid 2 mg oral tablet: 1 tab(s) orally every 6 hours, As Needed -for severe pain MDD:4 tabs   esomeprazole 20 mg oral delayed release tablet: 1 tab(s) orally once a day

## 2020-01-10 NOTE — DISCHARGE NOTE PROVIDER - NSDCCPCAREPLAN_GEN_ALL_CORE_FT
PRINCIPAL DISCHARGE DIAGNOSIS  Diagnosis: Gastric perforation, acute  Assessment and Plan of Treatment:

## 2020-01-10 NOTE — DISCHARGE NOTE PROVIDER - CARE PROVIDER_API CALL
Nav Carrillo)  Surgery  [address] [id number]  Phone: [phone]  Fax: [phone]  Follow Up Time:

## 2020-01-10 NOTE — DISCHARGE NOTE PROVIDER - NSDCFUADDAPPT_GEN_ALL_CORE_FT
Follow up with Dr. Carrillo in 1-2 weeks. Call the office at [phone] to schedule your appointment.     Instructions for follow-up, activity and diet: Please resume all regular home medications unless specifically advised not to take a particular medication. Also, please take any new medications as prescribed.    Please get plenty of rest, continue to ambulate several times per day, and drink adequate amounts of fluids. Avoid lifting weights greater than 5-10 lbs until you follow-up with your surgeon, who will instruct you further regarding activity restrictions. Avoid driving or operating heavy machinery while taking pain medications. You may shower letting soap and water run over incisions. Pat dry with clean towel when finished.    Call the office if you experience increasing abdominal pain, nausea, vomiting, or temperature >101 F. Follow up with Dr. Carrillo in 1 month. Call the office at [phone] to schedule your appointment. Email [email or web address]. You can see Mari Humphrey on Wednesday to remove the rest of your staples.     Instructions for follow-up, activity and diet: Please resume all regular home medications unless specifically advised not to take a particular medication. Also, please take any new medications as prescribed.    Please get plenty of rest, continue to ambulate several times per day, and drink adequate amounts of fluids. Avoid lifting weights greater than 5-10 lbs until you follow-up with your surgeon, who will instruct you further regarding activity restrictions. Avoid driving or operating heavy machinery while taking pain medications. You may shower letting soap and water run over incisions. Pat dry with clean towel when finished. Keep area around J tube clean.     Call the office if you experience increasing abdominal pain, nausea, vomiting, or temperature >101 F.

## 2020-01-10 NOTE — PROGRESS NOTE ADULT - SUBJECTIVE AND OBJECTIVE BOX
SUBJECTIVE: juan antonio diet, feeling well, pan controlled, c/o RLQ pain w/ urination. Patient seen and examined bedside by chief resident.    fluconAZOLE IVPB 400 milliGRAM(s) IV Intermittent every 24 hours  heparin  Injectable 5000 Unit(s) SubCutaneous every 8 hours  piperacillin/tazobactam IVPB.. 3.375 Gram(s) IV Intermittent every 6 hours    MEDICATIONS  (PRN):  HYDROmorphone   Tablet 2 milliGRAM(s) Oral every 4 hours PRN Moderate Pain (4 - 6)  HYDROmorphone  Injectable 0.5 milliGRAM(s) IV Push every 4 hours PRN breakthrough pain  ondansetron Injectable 4 milliGRAM(s) IV Push every 6 hours PRN Nausea      I&O's Detail    09 Jan 2020 07:01  -  10 Charanjit 2020 07:00  --------------------------------------------------------  IN:    IV PiggyBack: 200 mL    Oral Fluid: 660 mL  Total IN: 860 mL    OUT:    Bulb: 125 mL    Bulb: 40 mL    Voided: 1200 mL  Total OUT: 1365 mL    Total NET: -505 mL          Vital Signs Last 24 Hrs  T(C): 36.5 (10 Charanjit 2020 05:21), Max: 36.8 (09 Jan 2020 20:32)  T(F): 97.7 (10 Charanjit 2020 05:21), Max: 98.2 (09 Jan 2020 20:32)  HR: 59 (10 Charanjit 2020 05:21) (59 - 77)  BP: 106/69 (10 Charanjit 2020 05:21) (78/52 - 112/75)  BP(mean): --  RR: 18 (10 Charanjit 2020 05:21) (15 - 18)  SpO2: 99% (10 Charanjit 2020 05:21) (97% - 99%)    General: NAD, resting comfortably in bed  C/V: NSR  Pulm: Nonlabored breathing, no respiratory distress  Abd: soft, NT/ND, incisions CDI, WILLIE SS x2  Extrem:  SCDs in place    LABS:                        9.6    4.76  )-----------( 318      ( 09 Jan 2020 07:07 )             28.8     01-09    140  |  103  |  4<L>  ----------------------------<  96  3.7   |  24  |  0.64    Ca    9.1      09 Jan 2020 07:07  Phos  4.4     01-09  Mg     2.1     01-09

## 2020-01-10 NOTE — DISCHARGE NOTE PROVIDER - HOSPITAL COURSE
19F with PMHx H. pylori, pyloric stenosis presented to the ED with tachycardia, peritoneal signs, and imaging demonstrating free air under the diaphragm with contrast extravasation after undergoing EGD with pyloric dilation two days prior. She was taken to the OR emergently and underwent pyloric exclusion, gastrojejunostomy, omental patch, feeding jejunostomy (1/4). She tolerated the procedure well and was sent to the SICU for further monitoring. On POD 1, her mccauley was removed and she passed her trial of void. On POD 2, a UGI was performed which was normal. Her diet was slowly advanced and her tube feeds were slowly started. After starting the tube feeds, she began to experience some bloating and abdominal discomfort so they were held until **** 19F with PMHx H. pylori, pyloric stenosis presented to the ED with tachycardia, peritoneal signs, and imaging demonstrating free air under the diaphragm with contrast extravasation after undergoing EGD with pyloric dilation two days prior. She was taken to the OR emergently and underwent pyloric exclusion, gastrojejunostomy, omental patch, feeding jejunostomy (1/4). She tolerated the procedure well and was sent to the SICU for further monitoring. On POD 1, her mccauley was removed and she passed her trial of void. On POD 2, a UGI was performed which was normal. Her diet was slowly advanced and her tube feeds were slowly started. After starting the tube feeds, she began to experience some bloating and abdominal discomfort so they were held. On day of discharge patient was stable to be d/c'd home.

## 2020-01-11 ENCOUNTER — TRANSCRIPTION ENCOUNTER (OUTPATIENT)
Age: 20
End: 2020-01-11

## 2020-01-11 VITALS
OXYGEN SATURATION: 98 % | HEART RATE: 73 BPM | DIASTOLIC BLOOD PRESSURE: 70 MMHG | SYSTOLIC BLOOD PRESSURE: 105 MMHG | RESPIRATION RATE: 17 BRPM

## 2020-01-11 LAB
ANION GAP SERPL CALC-SCNC: 12 MMOL/L — SIGNIFICANT CHANGE UP (ref 5–17)
BUN SERPL-MCNC: 7 MG/DL — SIGNIFICANT CHANGE UP (ref 7–23)
CALCIUM SERPL-MCNC: 9.3 MG/DL — SIGNIFICANT CHANGE UP (ref 8.4–10.5)
CHLORIDE SERPL-SCNC: 104 MMOL/L — SIGNIFICANT CHANGE UP (ref 96–108)
CO2 SERPL-SCNC: 25 MMOL/L — SIGNIFICANT CHANGE UP (ref 22–31)
CREAT SERPL-MCNC: 0.66 MG/DL — SIGNIFICANT CHANGE UP (ref 0.5–1.3)
GLUCOSE SERPL-MCNC: 93 MG/DL — SIGNIFICANT CHANGE UP (ref 70–99)
HCT VFR BLD CALC: 30.5 % — LOW (ref 34.5–45)
HGB BLD-MCNC: 9.7 G/DL — LOW (ref 11.5–15.5)
MAGNESIUM SERPL-MCNC: 2.2 MG/DL — SIGNIFICANT CHANGE UP (ref 1.6–2.6)
MCHC RBC-ENTMCNC: 30.6 PG — SIGNIFICANT CHANGE UP (ref 27–34)
MCHC RBC-ENTMCNC: 31.8 GM/DL — LOW (ref 32–36)
MCV RBC AUTO: 96.2 FL — SIGNIFICANT CHANGE UP (ref 80–100)
NRBC # BLD: 0 /100 WBCS — SIGNIFICANT CHANGE UP (ref 0–0)
PHOSPHATE SERPL-MCNC: 4.4 MG/DL — SIGNIFICANT CHANGE UP (ref 2.5–4.5)
PLATELET # BLD AUTO: 375 K/UL — SIGNIFICANT CHANGE UP (ref 150–400)
POTASSIUM SERPL-MCNC: 4.2 MMOL/L — SIGNIFICANT CHANGE UP (ref 3.5–5.3)
POTASSIUM SERPL-SCNC: 4.2 MMOL/L — SIGNIFICANT CHANGE UP (ref 3.5–5.3)
RBC # BLD: 3.17 M/UL — LOW (ref 3.8–5.2)
RBC # FLD: 11.9 % — SIGNIFICANT CHANGE UP (ref 10.3–14.5)
SODIUM SERPL-SCNC: 141 MMOL/L — SIGNIFICANT CHANGE UP (ref 135–145)
WBC # BLD: 4.82 K/UL — SIGNIFICANT CHANGE UP (ref 3.8–10.5)
WBC # FLD AUTO: 4.82 K/UL — SIGNIFICANT CHANGE UP (ref 3.8–10.5)

## 2020-01-11 PROCEDURE — 82330 ASSAY OF CALCIUM: CPT

## 2020-01-11 PROCEDURE — 85610 PROTHROMBIN TIME: CPT

## 2020-01-11 PROCEDURE — 83605 ASSAY OF LACTIC ACID: CPT

## 2020-01-11 PROCEDURE — 83036 HEMOGLOBIN GLYCOSYLATED A1C: CPT

## 2020-01-11 PROCEDURE — 86901 BLOOD TYPING SEROLOGIC RH(D): CPT

## 2020-01-11 PROCEDURE — 71045 X-RAY EXAM CHEST 1 VIEW: CPT

## 2020-01-11 PROCEDURE — 82803 BLOOD GASES ANY COMBINATION: CPT

## 2020-01-11 PROCEDURE — 82962 GLUCOSE BLOOD TEST: CPT

## 2020-01-11 PROCEDURE — 81025 URINE PREGNANCY TEST: CPT

## 2020-01-11 PROCEDURE — 97161 PT EVAL LOW COMPLEX 20 MIN: CPT

## 2020-01-11 PROCEDURE — 36415 COLL VENOUS BLD VENIPUNCTURE: CPT

## 2020-01-11 PROCEDURE — 86850 RBC ANTIBODY SCREEN: CPT

## 2020-01-11 PROCEDURE — 84295 ASSAY OF SERUM SODIUM: CPT

## 2020-01-11 PROCEDURE — 97530 THERAPEUTIC ACTIVITIES: CPT

## 2020-01-11 PROCEDURE — 84100 ASSAY OF PHOSPHORUS: CPT

## 2020-01-11 PROCEDURE — 86900 BLOOD TYPING SEROLOGIC ABO: CPT

## 2020-01-11 PROCEDURE — 84132 ASSAY OF SERUM POTASSIUM: CPT

## 2020-01-11 PROCEDURE — 97116 GAIT TRAINING THERAPY: CPT

## 2020-01-11 PROCEDURE — 74240 X-RAY XM UPR GI TRC 1CNTRST: CPT

## 2020-01-11 PROCEDURE — 80053 COMPREHEN METABOLIC PANEL: CPT

## 2020-01-11 PROCEDURE — 83690 ASSAY OF LIPASE: CPT

## 2020-01-11 PROCEDURE — 81001 URINALYSIS AUTO W/SCOPE: CPT

## 2020-01-11 PROCEDURE — 82150 ASSAY OF AMYLASE: CPT

## 2020-01-11 PROCEDURE — 80048 BASIC METABOLIC PNL TOTAL CA: CPT

## 2020-01-11 PROCEDURE — 85027 COMPLETE CBC AUTOMATED: CPT

## 2020-01-11 PROCEDURE — 83735 ASSAY OF MAGNESIUM: CPT

## 2020-01-11 PROCEDURE — L8699: CPT

## 2020-01-11 PROCEDURE — C1889: CPT

## 2020-01-11 PROCEDURE — 85730 THROMBOPLASTIN TIME PARTIAL: CPT

## 2020-01-11 RX ORDER — DOCUSATE SODIUM 100 MG
1 CAPSULE ORAL
Qty: 14 | Refills: 0
Start: 2020-01-11 | End: 2020-01-17

## 2020-01-11 RX ORDER — HYDROMORPHONE HYDROCHLORIDE 2 MG/ML
1 INJECTION INTRAMUSCULAR; INTRAVENOUS; SUBCUTANEOUS
Qty: 10 | Refills: 0
Start: 2020-01-11 | End: 2020-01-11

## 2020-01-11 RX ORDER — ESOMEPRAZOLE MAGNESIUM 40 MG/1
1 CAPSULE, DELAYED RELEASE ORAL
Qty: 30 | Refills: 0
Start: 2020-01-11 | End: 2020-02-09

## 2020-01-11 RX ADMIN — Medication 650 MILLIGRAM(S): at 12:05

## 2020-01-11 RX ADMIN — Medication 650 MILLIGRAM(S): at 06:34

## 2020-01-11 RX ADMIN — PIPERACILLIN AND TAZOBACTAM 200 GRAM(S): 4; .5 INJECTION, POWDER, LYOPHILIZED, FOR SOLUTION INTRAVENOUS at 12:05

## 2020-01-11 RX ADMIN — HYDROMORPHONE HYDROCHLORIDE 2 MILLIGRAM(S): 2 INJECTION INTRAMUSCULAR; INTRAVENOUS; SUBCUTANEOUS at 06:33

## 2020-01-11 RX ADMIN — PIPERACILLIN AND TAZOBACTAM 200 GRAM(S): 4; .5 INJECTION, POWDER, LYOPHILIZED, FOR SOLUTION INTRAVENOUS at 06:35

## 2020-01-11 RX ADMIN — HEPARIN SODIUM 5000 UNIT(S): 5000 INJECTION INTRAVENOUS; SUBCUTANEOUS at 06:34

## 2020-01-11 RX ADMIN — HYDROMORPHONE HYDROCHLORIDE 2 MILLIGRAM(S): 2 INJECTION INTRAMUSCULAR; INTRAVENOUS; SUBCUTANEOUS at 07:10

## 2020-01-11 RX ADMIN — Medication 650 MILLIGRAM(S): at 12:31

## 2020-01-11 RX ADMIN — PANTOPRAZOLE SODIUM 40 MILLIGRAM(S): 20 TABLET, DELAYED RELEASE ORAL at 06:35

## 2020-01-11 RX ADMIN — Medication 650 MILLIGRAM(S): at 07:10

## 2020-01-11 NOTE — PROGRESS NOTE ADULT - SUBJECTIVE AND OBJECTIVE BOX
POD: 7  Procedure: diagnostic lap converted to open, D2 pyloric exclusion, gastrojejunostomy, omental patch, feeding jejunostomy     SUBJECTIVE: Patient seen and examined by chief resident on morning rounds. Pain well controlled, juan antonio regular diet, passing flatus, having BMs, voiding spontaneously.       Vital Signs Last 24 Hrs  T(C): 36.7 (11 Jan 2020 07:59), Max: 36.7 (10 Charanjit 2020 12:43)  T(F): 98.1 (11 Jan 2020 07:59), Max: 98.1 (11 Jan 2020 07:59)  HR: 67 (11 Jan 2020 07:59) (67 - 83)  BP: 101/57 (11 Jan 2020 07:59) (93/63 - 102/63)  BP(mean): --  RR: 17 (11 Jan 2020 07:59) (16 - 17)  SpO2: 97% (11 Jan 2020 07:59) (94% - 99%)    Physical Exam:  General: NAD  Pulmonary: Nonlabored breathing, no respiratory distress  Abdominal: soft, minimally distended, minimally tender with no rebound or guarding. R WILLIE with serous output, J tube in place with TFs being held   Extremities: WWP, normal strength, no clubbing/cyanosis/edema  Neuro: A/O x3    Lines/drains/tubes:    I&O's Summary    10 Charanjit 2020 07:01  -  11 Jan 2020 07:00  --------------------------------------------------------  IN: 1840 mL / OUT: 5407 mL / NET: -3567 mL        LABS:                        9.7    4.82  )-----------( 375      ( 11 Jan 2020 07:25 )             30.5     01-11    141  |  104  |  7   ----------------------------<  93  4.2   |  25  |  0.66    Ca    9.3      11 Jan 2020 07:25  Phos  4.4     01-11  Mg     2.2     01-11        Urinalysis Basic - ( 10 Charanjit 2020 16:21 )    Color: Yellow / Appearance: Clear / SG: <=1.005 / pH: x  Gluc: x / Ketone: NEGATIVE  / Bili: Negative / Urobili: 0.2 E.U./dL   Blood: x / Protein: NEGATIVE mg/dL / Nitrite: NEGATIVE   Leuk Esterase: NEGATIVE / RBC: Many /HPF / WBC < 5 /HPF   Sq Epi: x / Non Sq Epi: 0-5 /HPF / Bacteria: Present /HPF      CAPILLARY BLOOD GLUCOSE            RADIOLOGY & ADDITIONAL STUDIES:

## 2020-01-11 NOTE — DISCHARGE NOTE NURSING/CASE MANAGEMENT/SOCIAL WORK - PATIENT PORTAL LINK FT
You can access the FollowMyHealth Patient Portal offered by [facility] by registering at the following website: [email or web address]. By joining [facility]’s FollowMyHealth portal, you will also be able to view your health information using other applications (apps) compatible with our system.

## 2020-01-11 NOTE — PROGRESS NOTE ADULT - ASSESSMENT
19F PMHx H. pylori, pyloric stenosis s/p EGD w/ pyloric dilation (1/2) a/w gastric perforation, s/p pyloric exclusion, gastrojejunostomy, omental patch, feeding jejunostomy (1/4).    Regular diet, tube feeds being held, protonix BID  Pain/nausea control  Zosyn (1/2 - ), fluconazole (1/4 - )  SCDs/SQH  OOBA/IS  Drains: R WILLIE (over perforation), J tube (balloon w/ 3-5 cc)

## 2020-01-11 NOTE — DISCHARGE NOTE NURSING/CASE MANAGEMENT/SOCIAL WORK - NSDCFUADDAPPT_GEN_ALL_CORE_FT
Follow up with Dr. Carrillo in 1 month. Call the office at [phone] to schedule your appointment. Email [email or web address]. You can see Mari Humphrey on Wednesday to remove the rest of your staples.     Instructions for follow-up, activity and diet: Please resume all regular home medications unless specifically advised not to take a particular medication. Also, please take any new medications as prescribed.    Please get plenty of rest, continue to ambulate several times per day, and drink adequate amounts of fluids. Avoid lifting weights greater than 5-10 lbs until you follow-up with your surgeon, who will instruct you further regarding activity restrictions. Avoid driving or operating heavy machinery while taking pain medications. You may shower letting soap and water run over incisions. Pat dry with clean towel when finished. Keep area around J tube clean.     Call the office if you experience increasing abdominal pain, nausea, vomiting, or temperature >101 F.

## 2020-01-13 PROBLEM — K29.70 GASTRITIS, UNSPECIFIED, WITHOUT BLEEDING: Chronic | Status: ACTIVE | Noted: 2020-01-04

## 2020-01-13 PROBLEM — Z00.00 ENCOUNTER FOR PREVENTIVE HEALTH EXAMINATION: Status: ACTIVE | Noted: 2020-01-13

## 2020-01-15 ENCOUNTER — APPOINTMENT (OUTPATIENT)
Dept: SURGERY | Facility: CLINIC | Age: 20
End: 2020-01-15
Payer: COMMERCIAL

## 2020-01-15 VITALS
HEIGHT: 63 IN | BODY MASS INDEX: 23.21 KG/M2 | SYSTOLIC BLOOD PRESSURE: 114 MMHG | WEIGHT: 131 LBS | HEART RATE: 92 BPM | DIASTOLIC BLOOD PRESSURE: 82 MMHG

## 2020-01-15 DIAGNOSIS — K90.0 CELIAC DISEASE: ICD-10-CM

## 2020-01-15 DIAGNOSIS — Z46.59 ENCOUNTER FOR FITTING AND ADJUSTMENT OF OTHER GASTROINTESTINAL APPLIANCE AND DEVICE: ICD-10-CM

## 2020-01-15 DIAGNOSIS — Z87.19 PERSONAL HISTORY OF OTHER DISEASES OF THE DIGESTIVE SYSTEM: ICD-10-CM

## 2020-01-15 DIAGNOSIS — T81.89XA OTHER COMPLICATIONS OF PROCEDURES, NOT ELSEWHERE CLASSIFIED, INITIAL ENCOUNTER: ICD-10-CM

## 2020-01-15 PROCEDURE — 99213 OFFICE O/P EST LOW 20 MIN: CPT

## 2020-01-15 PROCEDURE — 99024 POSTOP FOLLOW-UP VISIT: CPT

## 2020-01-15 RX ORDER — OMEPRAZOLE 40 MG/1
CAPSULE, DELAYED RELEASE ORAL
Refills: 0 | Status: ACTIVE | COMMUNITY

## 2020-01-15 NOTE — PLAN
[FreeTextEntry1] : Follow-up with Dr. Carrillo as scheduled in 1 month\par Patient to pursue workup of cause of ulcer at [facility]\par Follow up with me as needed

## 2020-01-15 NOTE — ASSESSMENT
[FreeTextEntry1] : Recovering appropriately from repair/reconstruction s/p duodenal perforation. Staples removed. No evidence of issues with the J-tube site.

## 2020-01-15 NOTE — HISTORY OF PRESENT ILLNESS
[de-identified] : 18 yo F with history of pyloric ulcer s/p dilatation and perforated who was ultimately transferred to [facility] from [facility] and underwent exploratory laparotomy, Miguel Patch, pyloric exclusion, Billroth II reconstruction and feeding jejunostomy by Dr. Carrillo on 1/4/2020 presents for care for her incision. She has been doing well with no fevers, chills, minimal pain, no vomiting, normal bowel function and has been tolerating a diet. SHe does complain of pain related to her jejunostomy which she has not need for feedings as she is tolerating well po.

## 2020-01-15 NOTE — HISTORY OF PRESENT ILLNESS
[de-identified] : 20 yo F with history of pyloric ulcer s/p dilatation and perforated who was ultimately transferred to [facility] from [facility] and underwent exploratory laparotomy, Miguel Patch, pyloric exclusion, Billroth II reconstruction and feeding jejunostomy by Dr. Carrillo on 1/4/2020 presents for care for her incision. She has been doing well with no fevers, chills, minimal pain, no vomiting, normal bowel function and has been tolerating a diet. SHe does complain of pain related to her jejunostomy which she has not need for feedings as she is tolerating well po.

## 2020-01-15 NOTE — PHYSICAL EXAM
[Respiratory Effort] : normal respiratory effort [Normal Rate and Rhythm] : normal rate and rhythm [No Rash or Lesion] : No rash or lesion [Alert] : alert [Calm] : calm [de-identified] : NAD; well-appearing [de-identified] : soft, nondistended, nontender; incision healing well- staples removed and steristrips applied; J-tube in good position without any signs of infection or leakage; bumper secured in place with prolene stitch

## 2020-01-15 NOTE — PLAN
[FreeTextEntry1] : Follow-up with Dr. Carrillo as scheduled in 1 month\par Patient to pursue workup of cause of ulcer at [facility]\par Follow up with me as needed

## 2020-01-15 NOTE — PHYSICAL EXAM
[Respiratory Effort] : normal respiratory effort [No Rash or Lesion] : No rash or lesion [Normal Rate and Rhythm] : normal rate and rhythm [Alert] : alert [Calm] : calm [de-identified] : NAD; well-appearing [de-identified] : soft, nondistended, nontender; incision healing well- staples removed and steristrips applied; J-tube in good position without any signs of infection or leakage; bumper secured in place with prolene stitch

## 2020-01-16 DIAGNOSIS — K25.5 CHRONIC OR UNSPECIFIED GASTRIC ULCER WITH PERFORATION: ICD-10-CM
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
## 2020-01-16 DIAGNOSIS — K91.89 OTHER POSTPROCEDURAL COMPLICATIONS AND DISORDERS OF DIGESTIVE SYSTEM: ICD-10-CM

## 2020-01-16 DIAGNOSIS — Z86.19 PERSONAL HISTORY OF OTHER INFECTIOUS AND PARASITIC DISEASES: ICD-10-CM

## 2020-01-16 DIAGNOSIS — K65.8 OTHER PERITONITIS: ICD-10-CM

## 2020-01-16 DIAGNOSIS — K91.71 ACCIDENTAL PUNCTURE AND LACERATION OF A DIGESTIVE SYSTEM ORGAN OR STRUCTURE DURING A DIGESTIVE SYSTEM PROCEDURE: ICD-10-CM

## 2020-01-16 DIAGNOSIS — E43 UNSPECIFIED SEVERE PROTEIN-CALORIE MALNUTRITION: ICD-10-CM

## 2020-01-16 DIAGNOSIS — Z53.31 LAPAROSCOPIC SURGICAL PROCEDURE CONVERTED TO OPEN PROCEDURE: ICD-10-CM

## 2020-01-16 DIAGNOSIS — K63.1 PERFORATION OF INTESTINE (NONTRAUMATIC): ICD-10-CM

## 2020-01-28 ENCOUNTER — APPOINTMENT (OUTPATIENT)
Dept: BARIATRICS | Facility: CLINIC | Age: 20
End: 2020-01-28

## 2020-01-28 VITALS
BODY MASS INDEX: 23.84 KG/M2 | SYSTOLIC BLOOD PRESSURE: 124 MMHG | OXYGEN SATURATION: 98 % | WEIGHT: 134.56 LBS | HEIGHT: 63 IN | DIASTOLIC BLOOD PRESSURE: 85 MMHG | TEMPERATURE: 98.1 F

## 2020-02-08 ENCOUNTER — INPATIENT (INPATIENT)
Facility: HOSPITAL | Age: 20
LOS: 0 days | Discharge: HOME | DRG: 390 | End: 2020-02-09
Attending: GENERAL ACUTE CARE HOSPITAL | Admitting: GENERAL ACUTE CARE HOSPITAL
Payer: COMMERCIAL

## 2020-02-08 VITALS
RESPIRATION RATE: 16 BRPM | SYSTOLIC BLOOD PRESSURE: 121 MMHG | OXYGEN SATURATION: 100 % | TEMPERATURE: 98 F | HEART RATE: 95 BPM | DIASTOLIC BLOOD PRESSURE: 87 MMHG

## 2020-02-08 DIAGNOSIS — Z98.890 OTHER SPECIFIED POSTPROCEDURAL STATES: Chronic | ICD-10-CM

## 2020-02-08 DIAGNOSIS — Z98.0 INTESTINAL BYPASS AND ANASTOMOSIS STATUS: Chronic | ICD-10-CM

## 2020-02-08 PROCEDURE — 74019 RADEX ABDOMEN 2 VIEWS: CPT | Mod: 26

## 2020-02-08 PROCEDURE — 99222 1ST HOSP IP/OBS MODERATE 55: CPT | Mod: 24

## 2020-02-08 RX ORDER — PANTOPRAZOLE SODIUM 20 MG/1
40 TABLET, DELAYED RELEASE ORAL EVERY 24 HOURS
Refills: 0 | Status: DISCONTINUED | OUTPATIENT
Start: 2020-02-08 | End: 2020-02-09

## 2020-02-08 RX ORDER — ACETAMINOPHEN 500 MG
1000 TABLET ORAL ONCE
Refills: 0 | Status: COMPLETED | OUTPATIENT
Start: 2020-02-08 | End: 2020-02-08

## 2020-02-08 RX ORDER — SODIUM CHLORIDE 9 MG/ML
1000 INJECTION, SOLUTION INTRAVENOUS
Refills: 0 | Status: DISCONTINUED | OUTPATIENT
Start: 2020-02-08 | End: 2020-02-09

## 2020-02-08 RX ORDER — HYDROMORPHONE HYDROCHLORIDE 2 MG/ML
0.5 INJECTION INTRAMUSCULAR; INTRAVENOUS; SUBCUTANEOUS ONCE
Refills: 0 | Status: DISCONTINUED | OUTPATIENT
Start: 2020-02-08 | End: 2020-02-08

## 2020-02-08 RX ADMIN — PANTOPRAZOLE SODIUM 40 MILLIGRAM(S): 20 TABLET, DELAYED RELEASE ORAL at 06:00

## 2020-02-08 RX ADMIN — Medication 400 MILLIGRAM(S): at 05:14

## 2020-02-08 RX ADMIN — HYDROMORPHONE HYDROCHLORIDE 0.5 MILLIGRAM(S): 2 INJECTION INTRAMUSCULAR; INTRAVENOUS; SUBCUTANEOUS at 05:00

## 2020-02-08 RX ADMIN — HYDROMORPHONE HYDROCHLORIDE 0.5 MILLIGRAM(S): 2 INJECTION INTRAMUSCULAR; INTRAVENOUS; SUBCUTANEOUS at 04:43

## 2020-02-08 RX ADMIN — Medication 1000 MILLIGRAM(S): at 06:00

## 2020-02-08 NOTE — H&P ADULT - HISTORY OF PRESENT ILLNESS
This is a 19 year old female PMHx H. pylori, pyloric stenosis who was previously admitted to Dr. Nathan on 1/4/20 after presenting to the ED with tachycardia, peritoneal signs, and imaging demonstrating free air under the diaphragm with contrast extravasation after undergoing EGD with pyloric dilation two days prior. She was taken to the OR emergently and underwent pyloric exclusion, gastrojejunostomy, omental patch, feeding jejunostomy (1/4). The patient was discharged on 1/11/20, her J-tube was removed on 2/4. Since this time she has had no complaints, has been tolerating a regular diet and has had regular bowel function. She presented today to [facility] with complaints of sudden onset sharp, constant abdominal pain she localized to the epigastric region. No associated n/v/cp/sob/d/f/c. She states that she had a larger than usual BM yesterday, last passed gas 11:30am yesterday and had her last small BM at 1pm yesterday. At [facility] pt underwent CT w/PO and IV contrast of abdomen/pelvis that was read as partial small bowel obstruction with dilation of the jejunum and passage of contrast to the distal bowel.

## 2020-02-08 NOTE — H&P ADULT - ASSESSMENT
19 year old female PMH of H. pylori and recent h/o pyloric exclusion, gastrojejunostomy creation, omental patch and feeding j-tube placement for perforation post-EGD for pyloric stenosis presents with clinical and radiologic evidence suscipcious for partial small bowel obstruction.    NPO/IVF  Pain/Nausea control after assessment  SCDs  AM labs  Serial abdominal exams  Plan discussed with chief resident

## 2020-02-08 NOTE — H&P ADULT - NSHPPHYSICALEXAM_GEN_ALL_CORE
Vital Signs Last 24 Hrs  T(C): 36.7 (08 Feb 2020 04:00), Max: 36.7 (08 Feb 2020 04:00)  T(F): 98 (08 Feb 2020 04:00), Max: 98 (08 Feb 2020 04:00)  HR: 95 (08 Feb 2020 04:00) (95 - 95)  BP: 121/87 (08 Feb 2020 04:00) (121/87 - 121/87)  BP(mean): --  RR: 16 (08 Feb 2020 04:00) (16 - 16)  SpO2: 100% (08 Feb 2020 04:00) (100% - 100%)    General: Appears stated age, No acute distress, WD/WN  Head: NC/AT  EENT: PERRLA. EOMI. Conjunctiva and sclera clear. Pharynx clear.  Neck: Supple.  Lungs: CTA B/l. Nonlabored Respirations  CV: +S1S2, RRR  Abdomen: abdomen is soft, non-distended, tender to palpation infraumbilical area. no rebound or involuntary guarding  Extremities: Warm and well perfused. 2+ peripheral pulses b/l. Calf soft, nontender b/l. No pedal edema.  Neuro: A&Ox3.

## 2020-02-08 NOTE — PATIENT PROFILE ADULT - STATED REASON FOR ADMISSION
Patient has pyloric sphincter, endoscopy was done but perforation happened, septic shock styarts, petra en y was done Charanjit 4 --> partial obstruction

## 2020-02-08 NOTE — H&P ADULT - NSICDXPASTSURGICALHX_GEN_ALL_CORE_FT
PAST SURGICAL HISTORY:  H/O bypass gastrojejunostomy pyloric exclusion, creation of gastrojejunostomy, omental patch and feeding jejunostomy tube placement    H/O jejunostomy     H/O reduction mammoplasty

## 2020-02-09 ENCOUNTER — TRANSCRIPTION ENCOUNTER (OUTPATIENT)
Age: 20
End: 2020-02-09

## 2020-02-09 VITALS
DIASTOLIC BLOOD PRESSURE: 64 MMHG | RESPIRATION RATE: 17 BRPM | SYSTOLIC BLOOD PRESSURE: 115 MMHG | TEMPERATURE: 98 F | HEART RATE: 72 BPM | OXYGEN SATURATION: 97 %

## 2020-02-09 LAB
ALBUMIN SERPL ELPH-MCNC: 4.6 G/DL — SIGNIFICANT CHANGE UP (ref 3.3–5)
ALP SERPL-CCNC: 56 U/L — SIGNIFICANT CHANGE UP (ref 40–120)
ALT FLD-CCNC: 12 U/L — SIGNIFICANT CHANGE UP (ref 10–45)
ANION GAP SERPL CALC-SCNC: 12 MMOL/L — SIGNIFICANT CHANGE UP (ref 5–17)
AST SERPL-CCNC: 14 U/L — SIGNIFICANT CHANGE UP (ref 10–40)
BILIRUB SERPL-MCNC: 0.8 MG/DL — SIGNIFICANT CHANGE UP (ref 0.2–1.2)
BUN SERPL-MCNC: 4 MG/DL — LOW (ref 7–23)
CALCIUM SERPL-MCNC: 9.7 MG/DL — SIGNIFICANT CHANGE UP (ref 8.4–10.5)
CHLORIDE SERPL-SCNC: 104 MMOL/L — SIGNIFICANT CHANGE UP (ref 96–108)
CO2 SERPL-SCNC: 24 MMOL/L — SIGNIFICANT CHANGE UP (ref 22–31)
CREAT SERPL-MCNC: 0.73 MG/DL — SIGNIFICANT CHANGE UP (ref 0.5–1.3)
GLUCOSE SERPL-MCNC: 88 MG/DL — SIGNIFICANT CHANGE UP (ref 70–99)
HCT VFR BLD CALC: 36.4 % — SIGNIFICANT CHANGE UP (ref 34.5–45)
HGB BLD-MCNC: 11.6 G/DL — SIGNIFICANT CHANGE UP (ref 11.5–15.5)
LACTATE SERPL-SCNC: 1.6 MMOL/L — SIGNIFICANT CHANGE UP (ref 0.5–2)
MAGNESIUM SERPL-MCNC: 2.2 MG/DL — SIGNIFICANT CHANGE UP (ref 1.6–2.6)
MCHC RBC-ENTMCNC: 31 PG — SIGNIFICANT CHANGE UP (ref 27–34)
MCHC RBC-ENTMCNC: 31.9 GM/DL — LOW (ref 32–36)
MCV RBC AUTO: 97.3 FL — SIGNIFICANT CHANGE UP (ref 80–100)
NRBC # BLD: 0 /100 WBCS — SIGNIFICANT CHANGE UP (ref 0–0)
PHOSPHATE SERPL-MCNC: 4.6 MG/DL — HIGH (ref 2.5–4.5)
PLATELET # BLD AUTO: 280 K/UL — SIGNIFICANT CHANGE UP (ref 150–400)
POTASSIUM SERPL-MCNC: 4.2 MMOL/L — SIGNIFICANT CHANGE UP (ref 3.5–5.3)
POTASSIUM SERPL-SCNC: 4.2 MMOL/L — SIGNIFICANT CHANGE UP (ref 3.5–5.3)
PROT SERPL-MCNC: 7.5 G/DL — SIGNIFICANT CHANGE UP (ref 6–8.3)
RBC # BLD: 3.74 M/UL — LOW (ref 3.8–5.2)
RBC # FLD: 13.4 % — SIGNIFICANT CHANGE UP (ref 10.3–14.5)
SODIUM SERPL-SCNC: 140 MMOL/L — SIGNIFICANT CHANGE UP (ref 135–145)
WBC # BLD: 6.03 K/UL — SIGNIFICANT CHANGE UP (ref 3.8–10.5)
WBC # FLD AUTO: 6.03 K/UL — SIGNIFICANT CHANGE UP (ref 3.8–10.5)

## 2020-02-09 PROCEDURE — 84100 ASSAY OF PHOSPHORUS: CPT

## 2020-02-09 PROCEDURE — 36415 COLL VENOUS BLD VENIPUNCTURE: CPT

## 2020-02-09 PROCEDURE — 74019 RADEX ABDOMEN 2 VIEWS: CPT

## 2020-02-09 PROCEDURE — 83605 ASSAY OF LACTIC ACID: CPT

## 2020-02-09 PROCEDURE — 83735 ASSAY OF MAGNESIUM: CPT

## 2020-02-09 PROCEDURE — 99232 SBSQ HOSP IP/OBS MODERATE 35: CPT | Mod: 24

## 2020-02-09 PROCEDURE — 80053 COMPREHEN METABOLIC PANEL: CPT

## 2020-02-09 PROCEDURE — 85027 COMPLETE CBC AUTOMATED: CPT

## 2020-02-09 PROCEDURE — G0378: CPT

## 2020-02-09 RX ORDER — DOCUSATE SODIUM 100 MG
1 CAPSULE ORAL
Qty: 14 | Refills: 0
Start: 2020-02-09 | End: 2020-02-15

## 2020-02-09 RX ORDER — ESOMEPRAZOLE MAGNESIUM 40 MG/1
1 CAPSULE, DELAYED RELEASE ORAL
Qty: 30 | Refills: 0
Start: 2020-02-09 | End: 2020-03-09

## 2020-02-09 RX ADMIN — PANTOPRAZOLE SODIUM 40 MILLIGRAM(S): 20 TABLET, DELAYED RELEASE ORAL at 05:09

## 2020-02-09 NOTE — DISCHARGE NOTE PROVIDER - NSDCFUADDINST_GEN_ALL_CORE_FT
General Discharge Instructions:  Please resume all regular home medications unless specifically advised not to take a particular medication. Also, please take any new medications as prescribed.  Please get plenty of rest, continue to ambulate several times per day, and drink adequate amounts of fluids.

## 2020-02-09 NOTE — DISCHARGE NOTE PROVIDER - NSDCCPCAREPLAN_GEN_ALL_CORE_FT
PRINCIPAL DISCHARGE DIAGNOSIS  Diagnosis: Partial bowel obstruction  Assessment and Plan of Treatment: You were admitted for a partial bowel obstruction,  You will take colace and esomeprazole to treat this.  Follow up with your doctor within 2 weeks.

## 2020-02-09 NOTE — PROGRESS NOTE ADULT - SUBJECTIVE AND OBJECTIVE BOX
SUBJECTIVE: Patient seen and examined by chief resident on morning rounds.  Pt states she feels well and would like to go home.      Vital Signs Last 24 Hrs  T(C): 36.8 (09 Feb 2020 08:10), Max: 36.9 (08 Feb 2020 16:48)  T(F): 98.2 (09 Feb 2020 08:10), Max: 98.5 (08 Feb 2020 16:48)  HR: 80 (09 Feb 2020 08:10) (70 - 80)  BP: 110/69 (09 Feb 2020 08:10) (95/65 - 113/68)  BP(mean): --  RR: 16 (09 Feb 2020 08:10) (16 - 17)  SpO2: 100% (09 Feb 2020 08:10) (98% - 100%)    Physical Exam:  General: NAD  Pulmonary: Nonlabored breathing, no respiratory distress  Abdominal: soft, nondistended, nontender with no rebound or guarding  Extremities: WWP, normal strength, no clubbing/cyanosis/edema  Neuro: A/O x3    Lines/drains/tubes:    I&O's Summary    08 Feb 2020 07:01  -  09 Feb 2020 07:00  --------------------------------------------------------  IN: 1100 mL / OUT: 1250 mL / NET: -150 mL        LABS:                        11.6   6.03  )-----------( 280      ( 09 Feb 2020 07:34 )             36.4     02-09    140  |  104  |  4<L>  ----------------------------<  88  4.2   |  24  |  0.73    Ca    9.7      09 Feb 2020 07:34  Phos  4.6     02-09  Mg     2.2     02-09    TPro  7.5  /  Alb  4.6  /  TBili  0.8  /  DBili  x   /  AST  14  /  ALT  12  /  AlkPhos  56  02-09        CAPILLARY BLOOD GLUCOSE        LIVER FUNCTIONS - ( 09 Feb 2020 07:34 )  Alb: 4.6 g/dL / Pro: 7.5 g/dL / ALK PHOS: 56 U/L / ALT: 12 U/L / AST: 14 U/L / GGT: x             RADIOLOGY & ADDITIONAL STUDIES:

## 2020-02-09 NOTE — DISCHARGE NOTE PROVIDER - HOSPITAL COURSE
Pt is a 19 year old female PMHx H. pylori, pyloric stenosis who was previously admitted to Dr. Carrillo on 1/4/20 after presenting to the ED with tachycardia, peritoneal signs, and imaging demonstrating free air under the diaphragm with contrast extravasation after undergoing EGD with pyloric dilation two days prior. She was taken to the OR emergently and underwent pyloric exclusion, gastrojejunostomy, omental patch, feeding jejunostomy (1/4). The patient was discharged on 1/11/20, her J-tube was removed on 2/4.  She was tolerating a regular diet after but started getting sharp abdominal pain.  She went to [facility] where a CT w/PO and IV contrast of abdomen/pelvis was done that reviled a partial small bowel obstruction with dilation of the jejunum and passage of contrast to the distal bowel.  At [facility] she was getting serial abdomen exam with CLD.  She was tolerating her diet well which was advanced on 2/9.  Her pain has since resolved and she was deemed stable for discharge.

## 2020-02-09 NOTE — DISCHARGE NOTE PROVIDER - NSDCMRMEDTOKEN_GEN_ALL_CORE_FT
Colace 100 mg oral capsule: 1 cap(s) orally 2 times a day, As Neededfor constipation   esomeprazole 20 mg oral delayed release tablet: 1 tab(s) orally once a day -for heartburn

## 2020-02-09 NOTE — DISCHARGE NOTE PROVIDER - CARE PROVIDER_API CALL
Nav Carrillo)  Surgery  [address] [id number]  Phone: [phone]  Fax: [phone]  Follow Up Time:

## 2020-02-14 DIAGNOSIS — R10.9 UNSPECIFIED ABDOMINAL PAIN: ICD-10-CM

## 2020-02-14 DIAGNOSIS — K29.70 GASTRITIS, UNSPECIFIED, WITHOUT BLEEDING: ICD-10-CM

## 2020-02-14 DIAGNOSIS — K56.609 UNSPECIFIED INTESTINAL OBSTRUCTION, UNSPECIFIED AS TO PARTIAL VERSUS COMPLETE OBSTRUCTION: ICD-10-CM

## 2021-09-10 NOTE — PHYSICAL THERAPY INITIAL EVALUATION ADULT - ACTIVE RANGE OF MOTION EXAMINATION, REHAB EVAL
bilateral upper extremity Active ROM was WFL (within functional limits)/bilateral  lower extremity Active ROM was WFL (within functional limits)
soft

## 2025-09-03 ENCOUNTER — NON-APPOINTMENT (OUTPATIENT)
Age: 25
End: 2025-09-03

## 2025-09-08 ENCOUNTER — NON-APPOINTMENT (OUTPATIENT)
Age: 25
End: 2025-09-08